# Patient Record
Sex: FEMALE | Race: WHITE | NOT HISPANIC OR LATINO | Employment: FULL TIME | URBAN - METROPOLITAN AREA
[De-identification: names, ages, dates, MRNs, and addresses within clinical notes are randomized per-mention and may not be internally consistent; named-entity substitution may affect disease eponyms.]

---

## 2023-02-17 PROBLEM — B96.89 BV (BACTERIAL VAGINOSIS): Status: ACTIVE | Noted: 2023-02-17

## 2023-02-17 PROBLEM — F41.8 DEPRESSION WITH ANXIETY: Status: ACTIVE | Noted: 2023-02-17

## 2023-02-17 PROBLEM — G47.09 OTHER INSOMNIA: Status: ACTIVE | Noted: 2023-02-17

## 2023-02-17 PROBLEM — J22 ACUTE RESPIRATORY INFECTION: Status: ACTIVE | Noted: 2023-02-17

## 2023-02-17 PROBLEM — J02.9 SORE THROAT: Status: ACTIVE | Noted: 2023-02-17

## 2023-02-17 PROBLEM — E53.8 VITAMIN B 12 DEFICIENCY: Status: ACTIVE | Noted: 2023-02-17

## 2023-02-17 PROBLEM — F41.9 ANXIETY: Status: ACTIVE | Noted: 2023-02-17

## 2023-02-17 PROBLEM — M54.2 NECK PAIN: Status: ACTIVE | Noted: 2023-02-17

## 2023-02-17 PROBLEM — Z04.9 CONDITION NOT FOUND: Status: ACTIVE | Noted: 2023-02-17

## 2023-02-17 PROBLEM — E03.9 HYPOTHYROID: Status: ACTIVE | Noted: 2023-02-17

## 2023-02-17 PROBLEM — F32.A MILD DEPRESSION: Status: ACTIVE | Noted: 2023-02-17

## 2023-02-17 PROBLEM — N76.0 BV (BACTERIAL VAGINOSIS): Status: ACTIVE | Noted: 2023-02-17

## 2023-02-17 PROBLEM — E66.9 OBESITY: Status: ACTIVE | Noted: 2023-02-17

## 2023-02-17 PROBLEM — M54.9 PAIN, UPPER BACK: Status: ACTIVE | Noted: 2023-02-17

## 2023-02-17 PROBLEM — M54.50 LOW BACK PAIN: Status: ACTIVE | Noted: 2023-02-17

## 2023-02-17 PROBLEM — R05.9 COUGH: Status: ACTIVE | Noted: 2023-02-17

## 2023-02-17 PROBLEM — B37.31 VAGINAL CANDIDIASIS: Status: ACTIVE | Noted: 2023-02-17

## 2023-02-17 PROBLEM — R63.5 ABNORMAL WEIGHT GAIN: Status: ACTIVE | Noted: 2023-02-17

## 2023-02-17 PROBLEM — N39.0 ACUTE UTI: Status: ACTIVE | Noted: 2023-02-17

## 2023-02-17 PROBLEM — F90.9 ADULT ADHD: Status: ACTIVE | Noted: 2023-02-17

## 2023-02-17 PROBLEM — E78.2 COMBINED HYPERLIPIDEMIA: Status: ACTIVE | Noted: 2023-02-17

## 2023-02-17 PROBLEM — N89.8 VAGINAL DISCHARGE: Status: ACTIVE | Noted: 2023-02-17

## 2023-02-17 PROBLEM — M54.50 ACUTE LOW BACK PAIN: Status: ACTIVE | Noted: 2023-02-17

## 2023-02-17 PROBLEM — N89.8 VAGINAL ODOR: Status: ACTIVE | Noted: 2023-02-17

## 2023-02-17 RX ORDER — NORGESTIMATE AND ETHINYL ESTRADIOL 0.25-0.035
1 KIT ORAL DAILY
COMMUNITY
Start: 2021-06-02

## 2023-02-17 RX ORDER — LEVOTHYROXINE SODIUM 50 UG/1
1 TABLET ORAL DAILY
COMMUNITY
Start: 2021-09-14 | End: 2023-08-30

## 2023-02-17 RX ORDER — IBUPROFEN 600 MG/1
600 TABLET ORAL 3 TIMES DAILY PRN
COMMUNITY
Start: 2021-11-23 | End: 2023-06-30 | Stop reason: ALTCHOICE

## 2023-02-17 RX ORDER — MUPIROCIN 20 MG/G
OINTMENT TOPICAL
COMMUNITY
End: 2023-03-31 | Stop reason: ALTCHOICE

## 2023-02-17 RX ORDER — DEXTROAMPHETAMINE SACCHARATE, AMPHETAMINE ASPARTATE, DEXTROAMPHETAMINE SULFATE AND AMPHETAMINE SULFATE 7.5; 7.5; 7.5; 7.5 MG/1; MG/1; MG/1; MG/1
TABLET ORAL
COMMUNITY
Start: 2020-07-06 | End: 2023-03-21 | Stop reason: SDUPTHER

## 2023-02-17 RX ORDER — BUPROPION HYDROCHLORIDE 150 MG/1
150 TABLET ORAL DAILY
COMMUNITY
End: 2023-03-31 | Stop reason: ALTCHOICE

## 2023-02-17 RX ORDER — GUAIFENESIN, PSEUDOEPHEDRINE HYDROCHLORIDE 600; 60 MG/1; MG/1
TABLET, EXTENDED RELEASE ORAL
COMMUNITY
Start: 2021-12-23

## 2023-03-21 DIAGNOSIS — F90.9 ADULT ADHD: ICD-10-CM

## 2023-03-21 RX ORDER — DEXTROAMPHETAMINE SACCHARATE, AMPHETAMINE ASPARTATE, DEXTROAMPHETAMINE SULFATE AND AMPHETAMINE SULFATE 7.5; 7.5; 7.5; 7.5 MG/1; MG/1; MG/1; MG/1
30 TABLET ORAL 2 TIMES DAILY
Qty: 60 TABLET | Refills: 0 | Status: SHIPPED | OUTPATIENT
Start: 2023-03-21 | End: 2023-05-08 | Stop reason: SDUPTHER

## 2023-03-31 ENCOUNTER — TELEMEDICINE (OUTPATIENT)
Dept: PRIMARY CARE | Facility: CLINIC | Age: 38
End: 2023-03-31
Payer: COMMERCIAL

## 2023-03-31 VITALS — BODY MASS INDEX: 32.14 KG/M2 | WEIGHT: 200 LBS | HEIGHT: 66 IN

## 2023-03-31 DIAGNOSIS — E66.09 CLASS 1 OBESITY DUE TO EXCESS CALORIES WITHOUT SERIOUS COMORBIDITY WITH BODY MASS INDEX (BMI) OF 32.0 TO 32.9 IN ADULT: ICD-10-CM

## 2023-03-31 DIAGNOSIS — F90.9 ADULT ADHD: ICD-10-CM

## 2023-03-31 DIAGNOSIS — F41.8 DEPRESSION WITH ANXIETY: Primary | ICD-10-CM

## 2023-03-31 DIAGNOSIS — E03.9 ACQUIRED HYPOTHYROIDISM: ICD-10-CM

## 2023-03-31 PROCEDURE — 99213 OFFICE O/P EST LOW 20 MIN: CPT | Performed by: INTERNAL MEDICINE

## 2023-03-31 RX ORDER — BUPROPION HYDROCHLORIDE 300 MG/1
300 TABLET ORAL EVERY MORNING
Qty: 30 TABLET | Refills: 1 | Status: SHIPPED | OUTPATIENT
Start: 2023-03-31 | End: 2023-04-26

## 2023-03-31 ASSESSMENT — ENCOUNTER SYMPTOMS
CARDIOVASCULAR NEGATIVE: 1
NEUROLOGICAL NEGATIVE: 1
HEMATOLOGIC/LYMPHATIC NEGATIVE: 1
GASTROINTESTINAL NEGATIVE: 1
EYES NEGATIVE: 1
CONSTITUTIONAL NEGATIVE: 1
RESPIRATORY NEGATIVE: 1

## 2023-03-31 ASSESSMENT — PATIENT HEALTH QUESTIONNAIRE - PHQ9
10. IF YOU CHECKED OFF ANY PROBLEMS, HOW DIFFICULT HAVE THESE PROBLEMS MADE IT FOR YOU TO DO YOUR WORK, TAKE CARE OF THINGS AT HOME, OR GET ALONG WITH OTHER PEOPLE: SOMEWHAT DIFFICULT
2. FEELING DOWN, DEPRESSED OR HOPELESS: SEVERAL DAYS
1. LITTLE INTEREST OR PLEASURE IN DOING THINGS: NOT AT ALL
SUM OF ALL RESPONSES TO PHQ9 QUESTIONS 1 AND 2: 1

## 2023-03-31 NOTE — PROGRESS NOTES
"Subjective   Patient ID: Ninfa Diaz is a 37 y.o. female who presents for Medication follow up .    here to f/u on depression,ADHD,hypothyroid and obesity,she stopped drinking alcohol and saw nutritionist and endo at [facility] and was able to loose some weight,she is feeling much better,no depressed moods but just feels more anxious because she is switching jobs,no alcohol since last visit with me.             Review of Systems   Constitutional: Negative.    HENT: Negative.     Eyes: Negative.    Respiratory: Negative.     Cardiovascular: Negative.    Gastrointestinal: Negative.    Genitourinary: Negative.    Neurological: Negative.    Hematological: Negative.    Psychiatric/Behavioral:          Some anxiety,changing jobs now.       Objective   Ht 1.676 m (5' 6\")   Wt 90.7 kg (200 lb)   BMI 32.28 kg/m²     Physical Exam  Constitutional:       General: She is not in acute distress.  Cardiovascular:      Rate and Rhythm: Normal rate.   Pulmonary:      Effort: No respiratory distress.   Neurological:      Mental Status: She is alert.         Assessment/Plan   Problem List Items Addressed This Visit          Endocrine/Metabolic    Hypothyroid    Obesity       Other    Adult ADHD    Depression with anxiety - Primary    Relevant Medications    buPROPion XL (Wellbutrin XL) 300 mg 24 hr tablet          "

## 2023-04-26 DIAGNOSIS — F90.9 ADULT ADHD: ICD-10-CM

## 2023-04-26 DIAGNOSIS — F41.8 DEPRESSION WITH ANXIETY: ICD-10-CM

## 2023-04-26 RX ORDER — BUPROPION HYDROCHLORIDE 300 MG/1
300 TABLET ORAL EVERY MORNING
Qty: 30 TABLET | Refills: 0 | Status: SHIPPED | OUTPATIENT
Start: 2023-04-26 | End: 2023-05-19

## 2023-05-08 RX ORDER — DEXTROAMPHETAMINE SACCHARATE, AMPHETAMINE ASPARTATE, DEXTROAMPHETAMINE SULFATE AND AMPHETAMINE SULFATE 7.5; 7.5; 7.5; 7.5 MG/1; MG/1; MG/1; MG/1
30 TABLET ORAL 2 TIMES DAILY
Qty: 60 TABLET | Refills: 0 | Status: SHIPPED | OUTPATIENT
Start: 2023-05-08 | End: 2023-06-30 | Stop reason: SDUPTHER

## 2023-05-12 ENCOUNTER — APPOINTMENT (OUTPATIENT)
Dept: PRIMARY CARE | Facility: CLINIC | Age: 38
End: 2023-05-12
Payer: COMMERCIAL

## 2023-05-19 DIAGNOSIS — F41.8 DEPRESSION WITH ANXIETY: ICD-10-CM

## 2023-05-19 RX ORDER — BUPROPION HYDROCHLORIDE 300 MG/1
300 TABLET ORAL EVERY MORNING
Qty: 30 TABLET | Refills: 0 | Status: SHIPPED | OUTPATIENT
Start: 2023-05-19 | End: 2023-06-12

## 2023-06-10 DIAGNOSIS — F41.8 DEPRESSION WITH ANXIETY: ICD-10-CM

## 2023-06-12 RX ORDER — BUPROPION HYDROCHLORIDE 300 MG/1
300 TABLET ORAL EVERY MORNING
Qty: 30 TABLET | Refills: 0 | Status: SHIPPED | OUTPATIENT
Start: 2023-06-12 | End: 2023-07-10

## 2023-06-30 ENCOUNTER — OFFICE VISIT (OUTPATIENT)
Dept: PRIMARY CARE | Facility: CLINIC | Age: 38
End: 2023-06-30
Payer: COMMERCIAL

## 2023-06-30 ENCOUNTER — LAB (OUTPATIENT)
Dept: LAB | Facility: LAB | Age: 38
End: 2023-06-30
Payer: COMMERCIAL

## 2023-06-30 VITALS
OXYGEN SATURATION: 98 % | DIASTOLIC BLOOD PRESSURE: 78 MMHG | WEIGHT: 200 LBS | SYSTOLIC BLOOD PRESSURE: 134 MMHG | TEMPERATURE: 96.9 F | HEART RATE: 78 BPM | HEIGHT: 66 IN | BODY MASS INDEX: 32.14 KG/M2

## 2023-06-30 DIAGNOSIS — E66.09 CLASS 1 OBESITY DUE TO EXCESS CALORIES WITHOUT SERIOUS COMORBIDITY WITH BODY MASS INDEX (BMI) OF 32.0 TO 32.9 IN ADULT: ICD-10-CM

## 2023-06-30 DIAGNOSIS — E03.9 ACQUIRED HYPOTHYROIDISM: ICD-10-CM

## 2023-06-30 DIAGNOSIS — M54.50 LOW BACK PAIN, UNSPECIFIED BACK PAIN LATERALITY, UNSPECIFIED CHRONICITY, UNSPECIFIED WHETHER SCIATICA PRESENT: ICD-10-CM

## 2023-06-30 DIAGNOSIS — F41.9 ANXIETY: ICD-10-CM

## 2023-06-30 DIAGNOSIS — L27.2 FOOD ALLERGIC SKIN REACTION: ICD-10-CM

## 2023-06-30 DIAGNOSIS — E78.2 COMBINED HYPERLIPIDEMIA: ICD-10-CM

## 2023-06-30 DIAGNOSIS — F90.9 ADULT ADHD: ICD-10-CM

## 2023-06-30 DIAGNOSIS — F41.8 DEPRESSION WITH ANXIETY: Primary | ICD-10-CM

## 2023-06-30 LAB — THYROTROPIN (MIU/L) IN SER/PLAS BY DETECTION LIMIT <= 0.05 MIU/L: 1.81 MIU/L (ref 0.44–3.98)

## 2023-06-30 PROCEDURE — 36415 COLL VENOUS BLD VENIPUNCTURE: CPT

## 2023-06-30 PROCEDURE — 84443 ASSAY THYROID STIM HORMONE: CPT

## 2023-06-30 PROCEDURE — 3008F BODY MASS INDEX DOCD: CPT | Performed by: INTERNAL MEDICINE

## 2023-06-30 PROCEDURE — 86003 ALLG SPEC IGE CRUDE XTRC EA: CPT

## 2023-06-30 PROCEDURE — 1036F TOBACCO NON-USER: CPT | Performed by: INTERNAL MEDICINE

## 2023-06-30 PROCEDURE — 99214 OFFICE O/P EST MOD 30 MIN: CPT | Performed by: INTERNAL MEDICINE

## 2023-06-30 RX ORDER — PHENTERMINE HYDROCHLORIDE 37.5 MG/1
37.5 TABLET ORAL
Qty: 30 TABLET | Refills: 0 | COMMUNITY
Start: 2023-06-02 | End: 2024-04-12 | Stop reason: DRUGHIGH

## 2023-06-30 RX ORDER — DEXTROAMPHETAMINE SACCHARATE, AMPHETAMINE ASPARTATE, DEXTROAMPHETAMINE SULFATE AND AMPHETAMINE SULFATE 7.5; 7.5; 7.5; 7.5 MG/1; MG/1; MG/1; MG/1
TABLET ORAL
Qty: 30 TABLET | Refills: 0 | Status: SHIPPED | OUTPATIENT
Start: 2023-06-30 | End: 2023-07-25 | Stop reason: SDUPTHER

## 2023-06-30 RX ORDER — IBUPROFEN 800 MG/1
800 TABLET ORAL 3 TIMES DAILY PRN
Qty: 60 TABLET | Refills: 0 | Status: SHIPPED | OUTPATIENT
Start: 2023-06-30 | End: 2023-07-17

## 2023-06-30 ASSESSMENT — ENCOUNTER SYMPTOMS
NEUROLOGICAL NEGATIVE: 1
EYES NEGATIVE: 1
BACK PAIN: 1
CONSTITUTIONAL NEGATIVE: 1
GASTROINTESTINAL NEGATIVE: 1
CARDIOVASCULAR NEGATIVE: 1
ROS SKIN COMMENTS: AS HPI.
RESPIRATORY NEGATIVE: 1
HEMATOLOGIC/LYMPHATIC NEGATIVE: 1
PSYCHIATRIC NEGATIVE: 1

## 2023-06-30 ASSESSMENT — PATIENT HEALTH QUESTIONNAIRE - PHQ9
7. TROUBLE CONCENTRATING ON THINGS, SUCH AS READING THE NEWSPAPER OR WATCHING TELEVISION: NOT AT ALL
8. MOVING OR SPEAKING SO SLOWLY THAT OTHER PEOPLE COULD HAVE NOTICED. OR THE OPPOSITE, BEING SO FIGETY OR RESTLESS THAT YOU HAVE BEEN MOVING AROUND A LOT MORE THAN USUAL: NOT AT ALL
6. FEELING BAD ABOUT YOURSELF - OR THAT YOU ARE A FAILURE OR HAVE LET YOURSELF OR YOUR FAMILY DOWN: NOT AT ALL
SUM OF ALL RESPONSES TO PHQ9 QUESTIONS 1 AND 2: 0
1. LITTLE INTEREST OR PLEASURE IN DOING THINGS: NOT AT ALL
9. THOUGHTS THAT YOU WOULD BE BETTER OFF DEAD, OR OF HURTING YOURSELF: NOT AT ALL
5. POOR APPETITE OR OVEREATING: NOT AT ALL
3. TROUBLE FALLING OR STAYING ASLEEP OR SLEEPING TOO MUCH: SEVERAL DAYS
10. IF YOU CHECKED OFF ANY PROBLEMS, HOW DIFFICULT HAVE THESE PROBLEMS MADE IT FOR YOU TO DO YOUR WORK, TAKE CARE OF THINGS AT HOME, OR GET ALONG WITH OTHER PEOPLE: NOT DIFFICULT AT ALL
2. FEELING DOWN, DEPRESSED OR HOPELESS: NOT AT ALL
SUM OF ALL RESPONSES TO PHQ QUESTIONS 1-9: 2
4. FEELING TIRED OR HAVING LITTLE ENERGY: SEVERAL DAYS

## 2023-06-30 NOTE — PROGRESS NOTES
"Subjective   Patient ID: Ninfa Diaz is a 38 y.o. female who presents for 6 month follow up.    here to f/u on depression,ADHD,hypothyroid and obesity,she stopped drinking alcohol and saw nutritionist and endo at [facility] and was able to loose some weight,she was started by endo on Adipex 1 in AM and was told to take only 1 Adderall in PM, and done well,she is feeling much better,denies any depressed or anxious moods.  She has her usual LBP and takes Ibp 600 mg less than twice a week PRN,but in past 800 mg worked better and wants a new Rx.  She noted some flushed face and itchy skin when eating some food,in past it happened after drinking alcohol,but she stopped a while ago.             Review of Systems   Constitutional: Negative.    HENT: Negative.     Eyes: Negative.    Respiratory: Negative.     Cardiovascular: Negative.    Gastrointestinal: Negative.    Genitourinary: Negative.    Musculoskeletal:  Positive for back pain.   Skin:         As HPI.   Neurological: Negative.    Hematological: Negative.    Psychiatric/Behavioral: Negative.         Objective   /78   Pulse 78   Temp 36.1 °C (96.9 °F) (Skin)   Ht 1.676 m (5' 6\")   Wt 90.7 kg (200 lb)   SpO2 98%   BMI 32.28 kg/m²     Physical Exam  Cardiovascular:      Rate and Rhythm: Normal rate.         Assessment/Plan   Problem List Items Addressed This Visit       Anxiety     Stable on current med.         Adult ADHD     Stable on current Adderall,but is on 1 only daily due to being on Adipex through her endocrinologist.         Relevant Medications    amphetamine-dextroamphetamine (Adderall) 30 mg tablet    Combined hyperlipidemia     Follow low fat diet.         Depression with anxiety - Primary    Hypothyroid     Stable on Levothyroxine.         Relevant Orders    TSH with reflex to Free T4 if abnormal    Low back pain    Relevant Medications    ibuprofen 800 mg tablet    Obesity     Improving with Adipex.         Food allergic skin reaction    " Relevant Orders    Food Allergy Profile IgE

## 2023-06-30 NOTE — ASSESSMENT & PLAN NOTE
Stable on current Adderall,but is on 1 only daily due to being on Adipex through her endocrinologist.

## 2023-07-01 LAB
ALLERGEN FOOD: CLAM (RUDITAPES SPP.) IGE (KU/L): <0.1 KU/L
ALLERGEN FOOD: EGG WHITE IGE (KU/L): <0.1 KU/L
ALLERGEN FOOD: FISH (COD) GADUS MORHUA) IGE (KU/L): <0.1 KU/L
ALLERGEN FOOD: MAIZE, CORN (ZEA MAYS) IGE (KU/L): <0.1 KU/L
ALLERGEN FOOD: MILK IGE (KU/L): <0.1 KU/L
ALLERGEN FOOD: PEANUT (ARACHIS HYPOGAEA) IGE (KU/L): <0.1 KU/L
ALLERGEN FOOD: SCALLOP (PECTEN SPP.) IGE (KU/L): <0.1 KU/L
ALLERGEN FOOD: SESAME SEED (SESAMUM INDICUM) IGE (KU/L): <0.1 KU/L
ALLERGEN FOOD: SHRIMP (P. BOREALIS/MONODON, M. BARBATA/JOYNERI) IGE (KU/L): <0.1 KU/L
ALLERGEN FOOD: SOYBEAN (GLYCINE MAX) IGE (KU/L): <0.1 KU/L
ALLERGEN FOOD: WALNUT (JUGLANS SPP.) IGE (KU/L): <0.1 KU/L
ALLERGEN FOOD: WHEAT (TRITICUM AESTIVUM) IGE (KU/L): <0.1 KU/L
IMMUNOCAP INTERPRETATION: NORMAL

## 2023-07-07 DIAGNOSIS — F41.8 DEPRESSION WITH ANXIETY: ICD-10-CM

## 2023-07-10 RX ORDER — BUPROPION HYDROCHLORIDE 300 MG/1
300 TABLET ORAL EVERY MORNING
Qty: 30 TABLET | Refills: 0 | Status: SHIPPED | OUTPATIENT
Start: 2023-07-10 | End: 2023-08-07

## 2023-07-16 DIAGNOSIS — M54.50 LOW BACK PAIN, UNSPECIFIED BACK PAIN LATERALITY, UNSPECIFIED CHRONICITY, UNSPECIFIED WHETHER SCIATICA PRESENT: ICD-10-CM

## 2023-07-16 DIAGNOSIS — F90.9 ADULT ADHD: ICD-10-CM

## 2023-07-17 RX ORDER — IBUPROFEN 800 MG/1
TABLET ORAL
Qty: 60 TABLET | Refills: 0 | Status: SHIPPED | OUTPATIENT
Start: 2023-07-17

## 2023-07-25 RX ORDER — DEXTROAMPHETAMINE SACCHARATE, AMPHETAMINE ASPARTATE, DEXTROAMPHETAMINE SULFATE AND AMPHETAMINE SULFATE 7.5; 7.5; 7.5; 7.5 MG/1; MG/1; MG/1; MG/1
TABLET ORAL
Qty: 30 TABLET | Refills: 0 | Status: SHIPPED | OUTPATIENT
Start: 2023-07-25 | End: 2023-08-21 | Stop reason: SDUPTHER

## 2023-08-06 DIAGNOSIS — F41.8 DEPRESSION WITH ANXIETY: ICD-10-CM

## 2023-08-07 RX ORDER — BUPROPION HYDROCHLORIDE 300 MG/1
300 TABLET ORAL
Qty: 30 TABLET | Refills: 0 | Status: SHIPPED | OUTPATIENT
Start: 2023-08-07 | End: 2023-08-15

## 2023-08-14 DIAGNOSIS — F41.8 DEPRESSION WITH ANXIETY: ICD-10-CM

## 2023-08-15 DIAGNOSIS — F41.8 DEPRESSION WITH ANXIETY: ICD-10-CM

## 2023-08-15 RX ORDER — BUPROPION HYDROCHLORIDE 300 MG/1
300 TABLET ORAL
Qty: 30 TABLET | Refills: 0 | Status: SHIPPED | OUTPATIENT
Start: 2023-08-15 | End: 2023-08-15

## 2023-08-15 RX ORDER — BUPROPION HYDROCHLORIDE 300 MG/1
300 TABLET ORAL
Qty: 90 TABLET | Refills: 3 | Status: SHIPPED | OUTPATIENT
Start: 2023-08-15 | End: 2023-11-09 | Stop reason: WASHOUT

## 2023-08-15 NOTE — TELEPHONE ENCOUNTER
Pt called stating pharmacy is trying to contacting us need to change Wellbutrin rx from 30 days to 90 days for insurance will pick it up the cost.

## 2023-08-21 ENCOUNTER — TELEPHONE (OUTPATIENT)
Dept: PRIMARY CARE | Facility: CLINIC | Age: 38
End: 2023-08-21
Payer: COMMERCIAL

## 2023-08-21 DIAGNOSIS — F90.9 ADULT ADHD: ICD-10-CM

## 2023-08-21 RX ORDER — DEXTROAMPHETAMINE SACCHARATE, AMPHETAMINE ASPARTATE, DEXTROAMPHETAMINE SULFATE AND AMPHETAMINE SULFATE 7.5; 7.5; 7.5; 7.5 MG/1; MG/1; MG/1; MG/1
30 TABLET ORAL 2 TIMES DAILY
Qty: 60 TABLET | Refills: 0 | Status: SHIPPED | OUTPATIENT
Start: 2023-08-21 | End: 2023-09-19 | Stop reason: SDUPTHER

## 2023-08-21 NOTE — TELEPHONE ENCOUNTER
Pt needs refill for adderall - but it needs to go back to 60 pills - 2 daily - send to [facility]

## 2023-08-30 DIAGNOSIS — E03.9 HYPOTHYROIDISM, UNSPECIFIED: ICD-10-CM

## 2023-08-30 RX ORDER — LEVOTHYROXINE SODIUM 50 UG/1
50 TABLET ORAL DAILY
Qty: 90 TABLET | Refills: 3 | Status: SHIPPED | OUTPATIENT
Start: 2023-08-30

## 2023-09-08 LAB
CHLAMYDIA TRACH., AMPLIFIED: NEGATIVE
N. GONORRHEA, AMPLIFIED: NEGATIVE
TRICHOMONAS VAGINALIS: NEGATIVE

## 2023-09-19 DIAGNOSIS — F90.9 ADULT ADHD: ICD-10-CM

## 2023-09-19 RX ORDER — DEXTROAMPHETAMINE SACCHARATE, AMPHETAMINE ASPARTATE, DEXTROAMPHETAMINE SULFATE AND AMPHETAMINE SULFATE 7.5; 7.5; 7.5; 7.5 MG/1; MG/1; MG/1; MG/1
30 TABLET ORAL 2 TIMES DAILY
Qty: 60 TABLET | Refills: 0 | Status: SHIPPED | OUTPATIENT
Start: 2023-09-19 | End: 2023-10-17 | Stop reason: SDUPTHER

## 2023-10-17 DIAGNOSIS — F90.9 ADULT ADHD: ICD-10-CM

## 2023-10-17 RX ORDER — DEXTROAMPHETAMINE SACCHARATE, AMPHETAMINE ASPARTATE, DEXTROAMPHETAMINE SULFATE AND AMPHETAMINE SULFATE 7.5; 7.5; 7.5; 7.5 MG/1; MG/1; MG/1; MG/1
30 TABLET ORAL 2 TIMES DAILY
Qty: 60 TABLET | Refills: 0 | Status: SHIPPED | OUTPATIENT
Start: 2023-10-17 | End: 2023-11-20 | Stop reason: SDUPTHER

## 2023-10-27 PROBLEM — E78.5 DYSLIPIDEMIA: Status: ACTIVE | Noted: 2023-04-10

## 2023-11-09 ENCOUNTER — OFFICE VISIT (OUTPATIENT)
Dept: PRIMARY CARE | Facility: CLINIC | Age: 38
End: 2023-11-09
Payer: COMMERCIAL

## 2023-11-09 VITALS
RESPIRATION RATE: 16 BRPM | SYSTOLIC BLOOD PRESSURE: 130 MMHG | OXYGEN SATURATION: 98 % | TEMPERATURE: 98.8 F | WEIGHT: 194 LBS | HEIGHT: 67 IN | BODY MASS INDEX: 30.45 KG/M2 | DIASTOLIC BLOOD PRESSURE: 86 MMHG | HEART RATE: 83 BPM

## 2023-11-09 DIAGNOSIS — J06.9 ACUTE UPPER RESPIRATORY INFECTION: Primary | ICD-10-CM

## 2023-11-09 DIAGNOSIS — J02.9 SORE THROAT: ICD-10-CM

## 2023-11-09 PROCEDURE — 3008F BODY MASS INDEX DOCD: CPT | Performed by: STUDENT IN AN ORGANIZED HEALTH CARE EDUCATION/TRAINING PROGRAM

## 2023-11-09 PROCEDURE — 1036F TOBACCO NON-USER: CPT | Performed by: STUDENT IN AN ORGANIZED HEALTH CARE EDUCATION/TRAINING PROGRAM

## 2023-11-09 PROCEDURE — 99213 OFFICE O/P EST LOW 20 MIN: CPT | Performed by: STUDENT IN AN ORGANIZED HEALTH CARE EDUCATION/TRAINING PROGRAM

## 2023-11-09 RX ORDER — AZITHROMYCIN 250 MG/1
TABLET, FILM COATED ORAL
Qty: 6 TABLET | Refills: 0 | Status: SHIPPED | OUTPATIENT
Start: 2023-11-09 | End: 2023-11-14

## 2023-11-09 ASSESSMENT — ENCOUNTER SYMPTOMS
VOICE CHANGE: 1
VOMITING: 0
LIGHT-HEADEDNESS: 1
SINUS PRESSURE: 1
FEVER: 0
SINUS PAIN: 0
DIZZINESS: 0
RHINORRHEA: 1
TROUBLE SWALLOWING: 1
NAUSEA: 1
WHEEZING: 0
DIAPHORESIS: 0
SHORTNESS OF BREATH: 0
DIARRHEA: 0
CHILLS: 0
DYSURIA: 0
SORE THROAT: 1

## 2023-11-20 DIAGNOSIS — F90.9 ADULT ADHD: ICD-10-CM

## 2023-11-20 RX ORDER — DEXTROAMPHETAMINE SACCHARATE, AMPHETAMINE ASPARTATE, DEXTROAMPHETAMINE SULFATE AND AMPHETAMINE SULFATE 7.5; 7.5; 7.5; 7.5 MG/1; MG/1; MG/1; MG/1
30 TABLET ORAL 2 TIMES DAILY
Qty: 60 TABLET | Refills: 0 | Status: SHIPPED | OUTPATIENT
Start: 2023-11-20 | End: 2023-12-26 | Stop reason: SDUPTHER

## 2023-12-22 ENCOUNTER — APPOINTMENT (OUTPATIENT)
Dept: PRIMARY CARE | Facility: CLINIC | Age: 38
End: 2023-12-22
Payer: COMMERCIAL

## 2023-12-22 DIAGNOSIS — F90.9 ADULT ADHD: ICD-10-CM

## 2023-12-26 RX ORDER — DEXTROAMPHETAMINE SACCHARATE, AMPHETAMINE ASPARTATE, DEXTROAMPHETAMINE SULFATE AND AMPHETAMINE SULFATE 7.5; 7.5; 7.5; 7.5 MG/1; MG/1; MG/1; MG/1
30 TABLET ORAL 2 TIMES DAILY
Qty: 60 TABLET | Refills: 0 | Status: SHIPPED | OUTPATIENT
Start: 2023-12-26 | End: 2024-02-02 | Stop reason: SDUPTHER

## 2023-12-29 ENCOUNTER — TELEPHONE (OUTPATIENT)
Dept: OBSTETRICS AND GYNECOLOGY | Facility: CLINIC | Age: 38
End: 2023-12-29
Payer: COMMERCIAL

## 2023-12-29 NOTE — TELEPHONE ENCOUNTER
"Pt states \"I have a yeast infection\" She c/o vulvar redness, swelling , itching with an increased whitish discharge x24hrs. Denies odor and urinary sx. D/t the holiday weekend, pt is unable to come to the office for a self swab. Per chart she has had sx and been tx'd in the past. Rx for Fluconazole phoned into pt pharm. She understands that an appt  is needed of sx return or do not resolve.  "

## 2024-01-05 ENCOUNTER — OFFICE VISIT (OUTPATIENT)
Dept: PRIMARY CARE | Facility: CLINIC | Age: 39
End: 2024-01-05
Payer: COMMERCIAL

## 2024-01-05 VITALS
HEIGHT: 67 IN | WEIGHT: 192.6 LBS | HEART RATE: 75 BPM | TEMPERATURE: 96.5 F | SYSTOLIC BLOOD PRESSURE: 128 MMHG | BODY MASS INDEX: 30.23 KG/M2 | OXYGEN SATURATION: 98 % | DIASTOLIC BLOOD PRESSURE: 88 MMHG

## 2024-01-05 DIAGNOSIS — Z12.39 ENCOUNTER FOR SCREENING FOR MALIGNANT NEOPLASM OF BREAST, UNSPECIFIED SCREENING MODALITY: ICD-10-CM

## 2024-01-05 DIAGNOSIS — E66.09 CLASS 1 OBESITY DUE TO EXCESS CALORIES WITHOUT SERIOUS COMORBIDITY WITH BODY MASS INDEX (BMI) OF 32.0 TO 32.9 IN ADULT: Chronic | ICD-10-CM

## 2024-01-05 DIAGNOSIS — E03.9 ACQUIRED HYPOTHYROIDISM: Chronic | ICD-10-CM

## 2024-01-05 DIAGNOSIS — F90.9 ADULT ADHD: Chronic | ICD-10-CM

## 2024-01-05 DIAGNOSIS — Z00.00 ROUTINE ADULT HEALTH MAINTENANCE: Primary | ICD-10-CM

## 2024-01-05 PROBLEM — E66.9 OBESITY: Chronic | Status: ACTIVE | Noted: 2023-02-17

## 2024-01-05 PROCEDURE — 99395 PREV VISIT EST AGE 18-39: CPT | Performed by: NURSE PRACTITIONER

## 2024-01-05 PROCEDURE — 1036F TOBACCO NON-USER: CPT | Performed by: NURSE PRACTITIONER

## 2024-01-05 PROCEDURE — 3008F BODY MASS INDEX DOCD: CPT | Performed by: NURSE PRACTITIONER

## 2024-01-05 ASSESSMENT — PROMIS GLOBAL HEALTH SCALE
RATE_MENTAL_HEALTH: VERY GOOD
RATE_SOCIAL_SATISFACTION: VERY GOOD
RATE_QUALITY_OF_LIFE: VERY GOOD
EMOTIONAL_PROBLEMS: SOMETIMES
RATE_AVERAGE_FATIGUE: MILD
CARRYOUT_PHYSICAL_ACTIVITIES: COMPLETELY
RATE_PHYSICAL_HEALTH: VERY GOOD
RATE_AVERAGE_PAIN: 0
RATE_GENERAL_HEALTH: VERY GOOD
CARRYOUT_SOCIAL_ACTIVITIES: VERY GOOD

## 2024-01-05 ASSESSMENT — PATIENT HEALTH QUESTIONNAIRE - PHQ9
2. FEELING DOWN, DEPRESSED OR HOPELESS: SEVERAL DAYS
SUM OF ALL RESPONSES TO PHQ9 QUESTIONS 1 AND 2: 2
1. LITTLE INTEREST OR PLEASURE IN DOING THINGS: SEVERAL DAYS
10. IF YOU CHECKED OFF ANY PROBLEMS, HOW DIFFICULT HAVE THESE PROBLEMS MADE IT FOR YOU TO DO YOUR WORK, TAKE CARE OF THINGS AT HOME, OR GET ALONG WITH OTHER PEOPLE: SOMEWHAT DIFFICULT

## 2024-01-05 NOTE — PROGRESS NOTES
"Subjective   Patient ID: Ninfa Diaz is a 38 y.o. female who presents for Annual Exam.    Well Adult Physical   Patient here for a comprehensive physical exam.The patient reports no problems    Do you take any herbs or supplements that were not prescribed by a doctor? yes VITAMIN C, D AND ZINC       History:  LMP: No LMP recorded.  Last pap date: 7/2020  Abnormal pap? no        On apidpex for weight loss. Given by endo.    Review of Systems  otherwise negative aside from what was mentioned above in HPI.    Objective   /88   Pulse 75   Temp 35.8 °C (96.5 °F)   Ht 1.689 m (5' 6.5\")   Wt 87.4 kg (192 lb 9.6 oz)   SpO2 98%   BMI 30.62 kg/m²     Physical Exam  Vitals reviewed.   Constitutional:       Appearance: Normal appearance.   HENT:      Head: Normocephalic.      Right Ear: Tympanic membrane, ear canal and external ear normal.      Left Ear: Tympanic membrane, ear canal and external ear normal.      Nose: Nose normal.      Mouth/Throat:      Mouth: Mucous membranes are moist.   Eyes:      Conjunctiva/sclera: Conjunctivae normal.      Pupils: Pupils are equal, round, and reactive to light.   Cardiovascular:      Rate and Rhythm: Normal rate and regular rhythm.      Pulses: Normal pulses.      Heart sounds: Normal heart sounds.   Pulmonary:      Effort: Pulmonary effort is normal.      Breath sounds: Normal breath sounds.   Chest:   Breasts:     Right: Normal.      Left: Normal.   Abdominal:      General: Abdomen is flat. Bowel sounds are normal.      Palpations: Abdomen is soft.   Musculoskeletal:         General: Normal range of motion.      Cervical back: Normal range of motion and neck supple.   Lymphadenopathy:      Upper Body:      Right upper body: No supraclavicular, axillary or pectoral adenopathy.      Left upper body: No supraclavicular, axillary or pectoral adenopathy.   Skin:     General: Skin is warm.   Neurological:      Mental Status: She is alert.   Psychiatric:         Mood and " Affect: Mood normal.         Behavior: Behavior normal.         Thought Content: Thought content normal.         Judgment: Judgment normal.         Assessment/Plan   Problem List Items Addressed This Visit             ICD-10-CM    Adult ADHD (Chronic) F90.9     Stable on current Adderall,but is on 1 only daily due to being on Adipex through her endocrinologist.         Hypothyroid (Chronic) E03.9     Stable on Levothyroxine.         Obesity (Chronic) E66.9     Improving with Adipex.          Other Visit Diagnoses         Codes    Routine adult health maintenance    -  Primary Z00.00    Encounter for screening for malignant neoplasm of breast, unspecified screening modality     Z12.39    Relevant Orders    BI mammo bilateral screening tomosynthesis          Health Maintenance  Women:  Mammogram annually starting at 40 in June; ordered  Self breast exams monthly  Clinical breast examination done per patient request.    Immunizations and routine follow up:  TDAP: recommended. DUE. Declines today  Influenza vaccine: Declines  Eye Examinations: annually  Dental Examinations: semi annually    Encourage diet and exercise to maintain healthy lifestyle.

## 2024-02-02 ENCOUNTER — TELEPHONE (OUTPATIENT)
Dept: PRIMARY CARE | Facility: CLINIC | Age: 39
End: 2024-02-02

## 2024-02-02 ENCOUNTER — APPOINTMENT (OUTPATIENT)
Dept: PRIMARY CARE | Facility: CLINIC | Age: 39
End: 2024-02-02
Payer: COMMERCIAL

## 2024-02-02 DIAGNOSIS — F90.9 ADULT ADHD: ICD-10-CM

## 2024-02-02 RX ORDER — DEXTROAMPHETAMINE SACCHARATE, AMPHETAMINE ASPARTATE, DEXTROAMPHETAMINE SULFATE AND AMPHETAMINE SULFATE 7.5; 7.5; 7.5; 7.5 MG/1; MG/1; MG/1; MG/1
30 TABLET ORAL 2 TIMES DAILY
Qty: 60 TABLET | Refills: 0 | Status: SHIPPED | OUTPATIENT
Start: 2024-02-02 | End: 2024-02-26 | Stop reason: SDUPTHER

## 2024-02-02 NOTE — TELEPHONE ENCOUNTER
Pt of Dr Salomon's. Calling in regards to a refill on a medication. She is currently out of and would like it addressed before the weekend if possible. Please advise. Thank you!  [facility] [place] on [address]  Adderall 30mg twice a day #60

## 2024-02-26 DIAGNOSIS — F90.9 ADULT ADHD: ICD-10-CM

## 2024-02-27 RX ORDER — DEXTROAMPHETAMINE SACCHARATE, AMPHETAMINE ASPARTATE, DEXTROAMPHETAMINE SULFATE AND AMPHETAMINE SULFATE 7.5; 7.5; 7.5; 7.5 MG/1; MG/1; MG/1; MG/1
30 TABLET ORAL 2 TIMES DAILY
Qty: 60 TABLET | Refills: 0 | Status: SHIPPED | OUTPATIENT
Start: 2024-02-27 | End: 2024-03-26 | Stop reason: SDUPTHER

## 2024-03-01 ENCOUNTER — APPOINTMENT (OUTPATIENT)
Dept: PRIMARY CARE | Facility: CLINIC | Age: 39
End: 2024-03-01
Payer: COMMERCIAL

## 2024-03-08 ENCOUNTER — APPOINTMENT (OUTPATIENT)
Dept: PRIMARY CARE | Facility: CLINIC | Age: 39
End: 2024-03-08
Payer: COMMERCIAL

## 2024-03-13 ENCOUNTER — OFFICE VISIT (OUTPATIENT)
Dept: PRIMARY CARE | Facility: CLINIC | Age: 39
End: 2024-03-13
Payer: COMMERCIAL

## 2024-03-13 ENCOUNTER — LAB (OUTPATIENT)
Dept: LAB | Facility: LAB | Age: 39
End: 2024-03-13
Payer: COMMERCIAL

## 2024-03-13 VITALS
BODY MASS INDEX: 30.53 KG/M2 | DIASTOLIC BLOOD PRESSURE: 80 MMHG | WEIGHT: 192 LBS | SYSTOLIC BLOOD PRESSURE: 132 MMHG | HEART RATE: 69 BPM | OXYGEN SATURATION: 98 %

## 2024-03-13 DIAGNOSIS — C43.59 MALIGNANT MELANOMA OF TRUNK (MULTI): ICD-10-CM

## 2024-03-13 DIAGNOSIS — Z79.899 HIGH RISK MEDICATION USE: ICD-10-CM

## 2024-03-13 DIAGNOSIS — F90.9 ADULT ADHD: Primary | Chronic | ICD-10-CM

## 2024-03-13 PROBLEM — J32.9 SINUSITIS: Status: ACTIVE | Noted: 2023-02-17

## 2024-03-13 PROBLEM — R53.83 FATIGUE: Status: ACTIVE | Noted: 2024-03-13

## 2024-03-13 PROBLEM — F10.929 ALCOHOLIC INTOXICATION (CMS-HCC): Status: ACTIVE | Noted: 2024-03-13

## 2024-03-13 PROBLEM — R19.7 DIARRHEA: Status: ACTIVE | Noted: 2024-03-13

## 2024-03-13 LAB
AMPHETAMINES UR QL SCN: ABNORMAL
BARBITURATES UR QL SCN: ABNORMAL
BENZODIAZ UR QL SCN: ABNORMAL
BZE UR QL SCN: ABNORMAL
CANNABINOIDS UR QL SCN: ABNORMAL
FENTANYL+NORFENTANYL UR QL SCN: ABNORMAL
METHADONE UR QL SCN: ABNORMAL
OPIATES UR QL SCN: ABNORMAL
OXYCODONE+OXYMORPHONE UR QL SCN: ABNORMAL
PCP UR QL SCN: ABNORMAL

## 2024-03-13 PROCEDURE — 80307 DRUG TEST PRSMV CHEM ANLYZR: CPT

## 2024-03-13 PROCEDURE — 99214 OFFICE O/P EST MOD 30 MIN: CPT | Performed by: INTERNAL MEDICINE

## 2024-03-13 PROCEDURE — 3008F BODY MASS INDEX DOCD: CPT | Performed by: INTERNAL MEDICINE

## 2024-03-13 PROCEDURE — 1036F TOBACCO NON-USER: CPT | Performed by: INTERNAL MEDICINE

## 2024-03-13 PROCEDURE — 80324 DRUG SCREEN AMPHETAMINES 1/2: CPT

## 2024-03-13 ASSESSMENT — PATIENT HEALTH QUESTIONNAIRE - PHQ9
1. LITTLE INTEREST OR PLEASURE IN DOING THINGS: NOT AT ALL
2. FEELING DOWN, DEPRESSED OR HOPELESS: NOT AT ALL
SUM OF ALL RESPONSES TO PHQ9 QUESTIONS 1 AND 2: 0

## 2024-03-13 NOTE — PROGRESS NOTES
Subjective   Patient ID: Ninfa Diaz is a 38 y.o. female who presents for Follow-up (Patient is here for a follow up and a urine drug screen that will given at the lab. ).    here to f/u on depression,ADHD,hypothyroid and obesity,she stopped drinking alcohol and saw nutritionist and endo at [facility] and was able to loose some weight,she was started by endo on Adipex 1 in AM and was told to take only 1 Adderall in PM, and done well,she is feeling much better,denies any depressed or anxious moods.  Her Adipex has been reduced down to 15 mg.  She has her usual LBP and takes Ibp 600 mg less than twice a week PRN,but in past 800 mg worked better .  She is due for CS agreement signature and urine drug screen.  She is feeling that current Adderall is not helping her as it used to be in the past sometimes has trouble with concentration.               Review of Systems   Psychiatric/Behavioral:          As HPI.       Objective   /80 (BP Location: Left arm, Patient Position: Sitting, BP Cuff Size: Large adult)   Pulse 69   Wt 87.1 kg (192 lb)   SpO2 98%   BMI 30.53 kg/m²     Physical Exam  Constitutional:       Appearance: Normal appearance.   HENT:      Head: Normocephalic and atraumatic.   Eyes:      Extraocular Movements: Extraocular movements intact.      Pupils: Pupils are equal, round, and reactive to light.   Cardiovascular:      Rate and Rhythm: Normal rate and regular rhythm.      Heart sounds: Normal heart sounds.   Pulmonary:      Effort: Pulmonary effort is normal.      Breath sounds: Normal breath sounds. No wheezing or rhonchi.   Musculoskeletal:         General: Normal range of motion.      Cervical back: Normal range of motion and neck supple.      Right lower leg: No edema.      Left lower leg: No edema.   Skin:     General: Skin is warm.   Neurological:      General: No focal deficit present.      Mental Status: She is alert and oriented to person, place, and time.   Psychiatric:         Mood and  Affect: Mood normal.         Behavior: Behavior normal.         Assessment/Plan   Problem List Items Addressed This Visit             ICD-10-CM    Adult ADHD - Primary (Chronic) F90.9     CS agreement form completed today.  Order urine drug screen.  Continue same Adderall for now, patient fell 5 days in past and 1 other medication, therefore we will refer her to a psychiatrist for consultation on her current ADHD medication.  Follow-up in 6 months.         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         High risk medication use Z79.899    Relevant Orders    Drug Screen, Urine With Reflex to Confirmation (Completed)

## 2024-03-14 PROBLEM — J22 ACUTE RESPIRATORY INFECTION: Status: RESOLVED | Noted: 2023-02-17 | Resolved: 2024-03-14

## 2024-03-14 PROBLEM — Z79.899 HIGH RISK MEDICATION USE: Status: ACTIVE | Noted: 2024-03-14

## 2024-03-15 NOTE — ASSESSMENT & PLAN NOTE
CS agreement form completed today.  Order urine drug screen.  Continue same Adderall for now, patient fell 5 days in past and 1 other medication, therefore we will refer her to a psychiatrist for consultation on her current ADHD medication.  Follow-up in 6 months.

## 2024-03-18 LAB
AMPHET UR-MCNC: >5000 NG/ML
MDA UR-MCNC: <200 NG/ML
MDEA UR-MCNC: <200 NG/ML
MDMA UR-MCNC: <200 NG/ML
METHAMPHET UR-MCNC: <200 NG/ML
PHENTERMINE UR CFM-MCNC: >5000 NG/ML

## 2024-03-26 DIAGNOSIS — F90.9 ADULT ADHD: ICD-10-CM

## 2024-03-26 RX ORDER — DEXTROAMPHETAMINE SACCHARATE, AMPHETAMINE ASPARTATE, DEXTROAMPHETAMINE SULFATE AND AMPHETAMINE SULFATE 7.5; 7.5; 7.5; 7.5 MG/1; MG/1; MG/1; MG/1
30 TABLET ORAL 2 TIMES DAILY
Qty: 60 TABLET | Refills: 0 | Status: SHIPPED | OUTPATIENT
Start: 2024-03-26 | End: 2024-04-24 | Stop reason: SDUPTHER

## 2024-04-11 ASSESSMENT — ENCOUNTER SYMPTOMS
COUGH: 1
RHINORRHEA: 1

## 2024-04-12 ENCOUNTER — TELEMEDICINE (OUTPATIENT)
Dept: PRIMARY CARE | Facility: CLINIC | Age: 39
End: 2024-04-12
Payer: COMMERCIAL

## 2024-04-12 VITALS — WEIGHT: 188 LBS | BODY MASS INDEX: 29.89 KG/M2

## 2024-04-12 DIAGNOSIS — R05.1 ACUTE COUGH: ICD-10-CM

## 2024-04-12 DIAGNOSIS — J22 ACUTE RESPIRATORY INFECTION: Primary | ICD-10-CM

## 2024-04-12 PROCEDURE — 99213 OFFICE O/P EST LOW 20 MIN: CPT | Performed by: INTERNAL MEDICINE

## 2024-04-12 PROCEDURE — 3008F BODY MASS INDEX DOCD: CPT | Performed by: INTERNAL MEDICINE

## 2024-04-12 PROCEDURE — 1036F TOBACCO NON-USER: CPT | Performed by: INTERNAL MEDICINE

## 2024-04-12 RX ORDER — PHENTERMINE HYDROCHLORIDE 15 MG/1
15 CAPSULE ORAL DAILY
COMMUNITY
Start: 2024-03-19

## 2024-04-12 RX ORDER — METHYLPREDNISOLONE 4 MG/1
TABLET ORAL
Qty: 21 TABLET | Refills: 0 | Status: SHIPPED | OUTPATIENT
Start: 2024-04-12 | End: 2024-04-19

## 2024-04-12 ASSESSMENT — ENCOUNTER SYMPTOMS
PSYCHIATRIC NEGATIVE: 1
GASTROINTESTINAL NEGATIVE: 1
HEMATOLOGIC/LYMPHATIC NEGATIVE: 1
CONSTITUTIONAL NEGATIVE: 1
SHORTNESS OF BREATH: 0
WHEEZING: 1
STRIDOR: 0
EYES NEGATIVE: 1
CARDIOVASCULAR NEGATIVE: 1
APNEA: 0
CHEST TIGHTNESS: 0
NEUROLOGICAL NEGATIVE: 1
COUGH: 1

## 2024-04-12 ASSESSMENT — PATIENT HEALTH QUESTIONNAIRE - PHQ9
2. FEELING DOWN, DEPRESSED OR HOPELESS: NOT AT ALL
SUM OF ALL RESPONSES TO PHQ9 QUESTIONS 1 AND 2: 0
1. LITTLE INTEREST OR PLEASURE IN DOING THINGS: NOT AT ALL

## 2024-04-12 NOTE — PROGRESS NOTES
Subjective   Patient ID: Ninfa Diaz is a 38 y.o. female who presents for Cough (Patient states that she got sick a couple of weeks ago and went to [facility] and flu, COVID, and strep were negative. Patient did have extreme body aches and fever that has gone away, but patient still has a lingering dry cough. ).    An interactive audio and video telecommunication system with patient real time communications between the patient (at the original site) and provider (at the distant site) was utilized to provide this telehealth service.  Verbal consent was requested and obtained from the patient at this date for a telehealth.  Patient seen virtually today because of worsening persistent cough and tickle in the throat she told me that, 1 week ago she had respiratory infection with temperature 101 for 4 days sore throat fatigue myalgia, clear rhinorrhea she was seen at [facility] minute clinic told had viral infection she tested negative for COVID and negative for strep, she did have some white spots in the back of the throat but resolved by now, no fever broke she just continue with cough.  She denies any shortness of breath or wheezing.  She tried Mucinex without any improvement.         Review of Systems   Constitutional: Negative.    HENT: Negative.     Eyes: Negative.    Respiratory:  Positive for cough and wheezing. Negative for apnea, chest tightness, shortness of breath and stridor.    Cardiovascular: Negative.    Gastrointestinal: Negative.    Genitourinary: Negative.    Neurological: Negative.    Hematological: Negative.    Psychiatric/Behavioral: Negative.         Objective   Wt 85.3 kg (188 lb)   BMI 29.89 kg/m²     Physical Exam  Constitutional:       General: She is not in acute distress.  Cardiovascular:      Rate and Rhythm: Normal rate.   Pulmonary:      Effort: No respiratory distress.   Neurological:      Mental Status: She is alert.         Assessment/Plan   Problem List Items Addressed This Visit              ICD-10-CM    Acute respiratory infection - Primary J22     Most likely viral respiratory infection and now has postviral cough syndrome.  Will treat with Medrol Dosepak.  I wanted to send Tessalon Perles and Proventil inhaler, she declined because they did not help her in the past and she does not like to use inhalers.  Advised her to use Delsym over-the-counter for cough suppressant.  Call me in 2 to 3 days if not better.         Relevant Medications    methylPREDNISolone (Medrol Dospak) 4 mg tablets    Acute cough R05.1    Relevant Medications    methylPREDNISolone (Medrol Dospak) 4 mg tablets

## 2024-04-12 NOTE — ASSESSMENT & PLAN NOTE
Most likely viral respiratory infection and now has postviral cough syndrome.  Will treat with Medrol Dosepak.  I wanted to send Tessalon Perles and Proventil inhaler, she declined because they did not help her in the past and she does not like to use inhalers.  Advised her to use Delsym over-the-counter for cough suppressant.  Call me in 2 to 3 days if not better.

## 2024-04-18 ENCOUNTER — TELEPHONE (OUTPATIENT)
Dept: PRIMARY CARE | Facility: CLINIC | Age: 39
End: 2024-04-18
Payer: COMMERCIAL

## 2024-04-18 DIAGNOSIS — R05.1 ACUTE COUGH: ICD-10-CM

## 2024-04-18 DIAGNOSIS — J22 ACUTE RESPIRATORY INFECTION: Primary | ICD-10-CM

## 2024-04-18 RX ORDER — HYDROCODONE BITARTRATE AND HOMATROPINE METHYLBROMIDE ORAL SOLUTION 5; 1.5 MG/5ML; MG/5ML
5 LIQUID ORAL EVERY 6 HOURS PRN
Qty: 100 ML | Refills: 0 | Status: SHIPPED | OUTPATIENT
Start: 2024-04-18 | End: 2024-04-23

## 2024-04-18 RX ORDER — AZITHROMYCIN 250 MG/1
TABLET, FILM COATED ORAL DAILY
Qty: 6 TABLET | Refills: 0 | Status: SHIPPED | OUTPATIENT
Start: 2024-04-18 | End: 2024-04-23

## 2024-04-18 NOTE — TELEPHONE ENCOUNTER
Pt called stating [place] canovist with Dr. Salomon, who told her to call back if not better. Pt has complete medication that was prescribed, but still had cough.    Pharmacy=  [facility] in [facility]    Please call pt at  [phone]

## 2024-04-24 DIAGNOSIS — F90.9 ADULT ADHD: ICD-10-CM

## 2024-04-24 RX ORDER — DEXTROAMPHETAMINE SACCHARATE, AMPHETAMINE ASPARTATE, DEXTROAMPHETAMINE SULFATE AND AMPHETAMINE SULFATE 7.5; 7.5; 7.5; 7.5 MG/1; MG/1; MG/1; MG/1
30 TABLET ORAL 2 TIMES DAILY
Qty: 60 TABLET | Refills: 0 | Status: SHIPPED | OUTPATIENT
Start: 2024-04-24 | End: 2024-05-22 | Stop reason: SDUPTHER

## 2024-05-14 NOTE — PROGRESS NOTES
"Subjective   Patient ID: Ninfa Diaz is a 38 y.o. female who presents for URI.    She is here for URI symptoms intermittent for last 2-3 days pt has c.,o sinus congestion, sinus pressure, drainage, sore throat, white spots on tonsils. There is cough occasionally productive of greenish mucus-though primarily this is associated with her sore throat. Denies wheezing, SOB, or fever. Pt has taken a covid test and it was negative. Works at [employer] part time so possible there are sick contacts there. No recent travel. Reports drinking a lot of water during the day.          Review of Systems   Constitutional:  Negative for chills, diaphoresis and fever.   HENT:  Positive for postnasal drip, rhinorrhea, sinus pressure, sore throat, trouble swallowing (no trouble keeping food or liquid down) and voice change (raspy). Negative for ear discharge, ear pain, hearing loss and sinus pain.         Ear itching mostly in left ear   Eyes:  Negative for visual disturbance.   Respiratory:  Negative for shortness of breath and wheezing.    Cardiovascular:  Negative for chest pain.   Gastrointestinal:  Positive for nausea (yesterday,not today). Negative for diarrhea and vomiting.   Endocrine: Negative for cold intolerance and heat intolerance.   Genitourinary:  Negative for dysuria.   Neurological:  Positive for light-headedness (With activity). Negative for dizziness.       Objective   /86   Pulse 83   Temp 37.1 °C (98.8 °F) (Tympanic)   Resp 16   Ht 1.689 m (5' 6.5\")   Wt 88 kg (194 lb)   SpO2 98%   BMI 30.84 kg/m²     Physical Exam  Vitals reviewed.   HENT:      Head: Normocephalic.      Right Ear: Tympanic membrane, ear canal and external ear normal. There is no impacted cerumen.      Left Ear: Tympanic membrane, ear canal and external ear normal. There is no impacted cerumen.      Mouth/Throat:      Mouth: Mucous membranes are moist.      Pharynx: Oropharyngeal exudate (mild white exudate over " Thank you for visiting today!     Please follow-up in 3 months     Please try and eat healthy, get at least 30 minutes of cardiovascular exercise a day to help keep your health as best as it can be.    If you have any questions or concerns please feel free to contact us at [phone].    If you feel like you are experiencing a medical emergency please seek immediate medical attention at an urgent care or in the emergency department.     bilateral tonsils. No lesions on soft palate or tongue) present. No posterior oropharyngeal erythema.   Cardiovascular:      Rate and Rhythm: Normal rate and regular rhythm.   Pulmonary:      Effort: Pulmonary effort is normal. No respiratory distress.      Breath sounds: Normal breath sounds.   Abdominal:      General: There is no distension.   Musculoskeletal:         General: No deformity.      Cervical back: Neck supple. No tenderness.   Lymphadenopathy:      Cervical: No cervical adenopathy.   Skin:     Coloration: Skin is not jaundiced.   Neurological:      Mental Status: She is alert.   Psychiatric:         Mood and Affect: Mood normal.         Behavior: Behavior normal.         Assessment/Plan   Problem List Items Addressed This Visit       Sore throat     Other Visit Diagnoses       Acute upper respiratory infection    -  Primary    Relevant Medications    azithromycin (Zithromax) 250 mg tablet          Acute URI  Sore throat  -Symptoms seem c/w URI and likely strep pharyngitis clinically  -Zpack sent to pharmacy given penicillin allergy.   -Plenty of fluids and rest. Consider flonase for a few weeks, nasal saline rinses.   -F/u 1-2 weeks if not much better  -F/u Dr. Salomon as previously scheduled.

## 2024-05-22 DIAGNOSIS — F90.9 ADULT ADHD: ICD-10-CM

## 2024-05-22 RX ORDER — DEXTROAMPHETAMINE SACCHARATE, AMPHETAMINE ASPARTATE, DEXTROAMPHETAMINE SULFATE AND AMPHETAMINE SULFATE 7.5; 7.5; 7.5; 7.5 MG/1; MG/1; MG/1; MG/1
TABLET ORAL
Qty: 60 TABLET | Refills: 0 | Status: SHIPPED | OUTPATIENT
Start: 2024-05-22

## 2024-06-06 ENCOUNTER — HOSPITAL ENCOUNTER (OUTPATIENT)
Dept: RADIOLOGY | Facility: CLINIC | Age: 39
Discharge: HOME | End: 2024-06-06
Payer: COMMERCIAL

## 2024-06-06 VITALS — BODY MASS INDEX: 30.22 KG/M2 | HEIGHT: 66 IN | WEIGHT: 188.05 LBS

## 2024-06-06 DIAGNOSIS — Z12.39 ENCOUNTER FOR SCREENING FOR MALIGNANT NEOPLASM OF BREAST, UNSPECIFIED SCREENING MODALITY: ICD-10-CM

## 2024-06-06 PROCEDURE — 77067 SCR MAMMO BI INCL CAD: CPT | Performed by: STUDENT IN AN ORGANIZED HEALTH CARE EDUCATION/TRAINING PROGRAM

## 2024-06-06 PROCEDURE — 77067 SCR MAMMO BI INCL CAD: CPT

## 2024-06-06 PROCEDURE — 77063 BREAST TOMOSYNTHESIS BI: CPT | Performed by: STUDENT IN AN ORGANIZED HEALTH CARE EDUCATION/TRAINING PROGRAM

## 2024-06-12 DIAGNOSIS — R92.8 ABNORMAL MAMMOGRAM: Primary | ICD-10-CM

## 2024-06-17 ENCOUNTER — TELEPHONE (OUTPATIENT)
Dept: PRIMARY CARE | Facility: CLINIC | Age: 39
End: 2024-06-17
Payer: COMMERCIAL

## 2024-06-17 NOTE — TELEPHONE ENCOUNTER
I called patient to discuss her mammogram which showed focal asymmetry, she already made an appointment for diagnostic mammogram and ultrasound to be done on 28 June at  but different location than [facility]. all question answered.

## 2024-06-21 DIAGNOSIS — F90.9 ADULT ADHD: ICD-10-CM

## 2024-06-21 RX ORDER — DEXTROAMPHETAMINE SACCHARATE, AMPHETAMINE ASPARTATE, DEXTROAMPHETAMINE SULFATE AND AMPHETAMINE SULFATE 7.5; 7.5; 7.5; 7.5 MG/1; MG/1; MG/1; MG/1
TABLET ORAL
Qty: 60 TABLET | Refills: 0 | Status: SHIPPED | OUTPATIENT
Start: 2024-06-21

## 2024-06-28 ENCOUNTER — HOSPITAL ENCOUNTER (OUTPATIENT)
Dept: RADIOLOGY | Facility: CLINIC | Age: 39
Discharge: HOME | End: 2024-06-28
Payer: COMMERCIAL

## 2024-06-28 ENCOUNTER — APPOINTMENT (OUTPATIENT)
Dept: RADIOLOGY | Facility: CLINIC | Age: 39
End: 2024-06-28
Payer: COMMERCIAL

## 2024-06-28 VITALS — HEIGHT: 66 IN | BODY MASS INDEX: 30.22 KG/M2 | WEIGHT: 188.05 LBS

## 2024-06-28 DIAGNOSIS — R92.8 ABNORMAL MAMMOGRAM: ICD-10-CM

## 2024-06-28 PROCEDURE — 77061 BREAST TOMOSYNTHESIS UNI: CPT | Mod: RT

## 2024-07-05 ENCOUNTER — APPOINTMENT (OUTPATIENT)
Dept: PRIMARY CARE | Facility: CLINIC | Age: 39
End: 2024-07-05
Payer: COMMERCIAL

## 2024-07-12 ENCOUNTER — APPOINTMENT (OUTPATIENT)
Dept: PRIMARY CARE | Facility: CLINIC | Age: 39
End: 2024-07-12
Payer: COMMERCIAL

## 2024-07-16 DIAGNOSIS — F90.9 ADULT ADHD: ICD-10-CM

## 2024-07-16 RX ORDER — DEXTROAMPHETAMINE SACCHARATE, AMPHETAMINE ASPARTATE, DEXTROAMPHETAMINE SULFATE AND AMPHETAMINE SULFATE 7.5; 7.5; 7.5; 7.5 MG/1; MG/1; MG/1; MG/1
TABLET ORAL
Qty: 60 TABLET | Refills: 0 | Status: SHIPPED | OUTPATIENT
Start: 2024-07-16

## 2024-07-23 DIAGNOSIS — Z30.49 ENCOUNTER FOR SURVEILLANCE OF OTHER CONTRACEPTIVE: ICD-10-CM

## 2024-07-24 RX ORDER — NORGESTIMATE AND ETHINYL ESTRADIOL 0.25-0.035
1 KIT ORAL DAILY
Qty: 84 TABLET | Refills: 0 | Status: SHIPPED | OUTPATIENT
Start: 2024-07-24

## 2024-08-02 ENCOUNTER — TELEPHONE (OUTPATIENT)
Dept: PRIMARY CARE | Facility: CLINIC | Age: 39
End: 2024-08-02
Payer: COMMERCIAL

## 2024-08-02 DIAGNOSIS — E03.9 HYPOTHYROIDISM, UNSPECIFIED: ICD-10-CM

## 2024-08-02 RX ORDER — LEVOTHYROXINE SODIUM 50 UG/1
50 TABLET ORAL DAILY
Qty: 90 TABLET | Refills: 3 | Status: SHIPPED | OUTPATIENT
Start: 2024-08-02

## 2024-08-02 NOTE — TELEPHONE ENCOUNTER
Pt called stating pharmacy has faxed us. Pt stated is almost out of her thyroid medicine.    [facility] [address]    Please call pt at  [phone]

## 2024-08-20 DIAGNOSIS — F90.9 ADULT ADHD: ICD-10-CM

## 2024-08-20 RX ORDER — DEXTROAMPHETAMINE SACCHARATE, AMPHETAMINE ASPARTATE, DEXTROAMPHETAMINE SULFATE AND AMPHETAMINE SULFATE 7.5; 7.5; 7.5; 7.5 MG/1; MG/1; MG/1; MG/1
TABLET ORAL
Qty: 60 TABLET | Refills: 0 | Status: SHIPPED | OUTPATIENT
Start: 2024-08-20

## 2024-09-13 ENCOUNTER — APPOINTMENT (OUTPATIENT)
Dept: OBSTETRICS AND GYNECOLOGY | Facility: CLINIC | Age: 39
End: 2024-09-13
Payer: COMMERCIAL

## 2024-09-16 DIAGNOSIS — F90.9 ADULT ADHD: ICD-10-CM

## 2024-09-17 RX ORDER — DEXTROAMPHETAMINE SACCHARATE, AMPHETAMINE ASPARTATE, DEXTROAMPHETAMINE SULFATE AND AMPHETAMINE SULFATE 7.5; 7.5; 7.5; 7.5 MG/1; MG/1; MG/1; MG/1
TABLET ORAL
Qty: 60 TABLET | Refills: 0 | Status: SHIPPED | OUTPATIENT
Start: 2024-09-17

## 2024-09-20 ENCOUNTER — APPOINTMENT (OUTPATIENT)
Dept: PRIMARY CARE | Facility: CLINIC | Age: 39
End: 2024-09-20
Payer: COMMERCIAL

## 2024-09-26 ENCOUNTER — APPOINTMENT (OUTPATIENT)
Dept: OBSTETRICS AND GYNECOLOGY | Facility: CLINIC | Age: 39
End: 2024-09-26
Payer: COMMERCIAL

## 2024-09-26 VITALS
HEIGHT: 67 IN | SYSTOLIC BLOOD PRESSURE: 110 MMHG | BODY MASS INDEX: 30.61 KG/M2 | WEIGHT: 195 LBS | DIASTOLIC BLOOD PRESSURE: 70 MMHG

## 2024-09-26 DIAGNOSIS — Z30.49 ENCOUNTER FOR SURVEILLANCE OF OTHER CONTRACEPTIVE: ICD-10-CM

## 2024-09-26 DIAGNOSIS — Z01.419 WELL WOMAN EXAM: ICD-10-CM

## 2024-09-26 DIAGNOSIS — N89.8 VAGINAL ODOR: Primary | ICD-10-CM

## 2024-09-26 DIAGNOSIS — Z11.3 ROUTINE SCREENING FOR STI (SEXUALLY TRANSMITTED INFECTION): ICD-10-CM

## 2024-09-26 PROCEDURE — 99396 PREV VISIT EST AGE 40-64: CPT | Performed by: ADVANCED PRACTICE MIDWIFE

## 2024-09-26 PROCEDURE — 3008F BODY MASS INDEX DOCD: CPT | Performed by: ADVANCED PRACTICE MIDWIFE

## 2024-09-26 PROCEDURE — 87591 N.GONORRHOEAE DNA AMP PROB: CPT

## 2024-09-26 PROCEDURE — 87205 SMEAR GRAM STAIN: CPT

## 2024-09-26 PROCEDURE — 1036F TOBACCO NON-USER: CPT | Performed by: ADVANCED PRACTICE MIDWIFE

## 2024-09-26 PROCEDURE — 87661 TRICHOMONAS VAGINALIS AMPLIF: CPT

## 2024-09-26 PROCEDURE — 87491 CHLMYD TRACH DNA AMP PROBE: CPT

## 2024-09-26 RX ORDER — NORGESTIMATE AND ETHINYL ESTRADIOL 0.25-0.035
1 KIT ORAL DAILY
Qty: 84 TABLET | Refills: 3 | Status: SHIPPED | OUTPATIENT
Start: 2024-09-26

## 2024-09-26 ASSESSMENT — PAIN SCALES - GENERAL: PAINLEVEL: 0-NO PAIN

## 2024-09-26 NOTE — ASSESSMENT & PLAN NOTE
Pap due 1 year  Up to date with mammogram    Discussed she can skip placebo week, discussed possibility of BTB.     Put a note in chart re: prior pregnancy outcome to not discuss.   Reports she may be considering pregnancy, discussed risk factors with age. Discussed can stop OCP when desired to attempt pregnancy.

## 2024-09-26 NOTE — PROGRESS NOTES
[facility] ( Breast and Pelvic exam ) Vaginal odor    Last pap: 4.6.2020 ( wnl -hpv )  Last mammo: 6.28.24 ( NEG )    Chaperone declined: La Vogel, CMA3    Assessment/Plan   Problem List Items Addressed This Visit             ICD-10-CM       Medium    Routine screening for STI (sexually transmitted infection) Z11.3    Vaginal odor - Primary N89.8     Vaginitis collected          Well woman exam Z01.419     Pap due 1 year  Up to date with mammogram    Discussed she can skip placebo week, discussed possibility of BTB.     Put a note in chart re: prior pregnancy outcome to not discuss.   Reports she may be considering pregnancy, discussed risk factors with age. Discussed can stop OCP when desired to attempt pregnancy.           Other Visit Diagnoses         Codes    Encounter for surveillance of other contraceptive     Z30.49            SARAH Elizalde-TRISTAN Pacheco   Ninfa Diaz is a 39 y.o. female who is here for a routine exam.     Stopped Phentermine last year @ [facility], was initially down 18#  Working with endocrine re: continued weight loss  Reports her issue is her eating habits    Going to [place] soon, wondering about skipping placebo week     Reports ? Vaginal odor  No itching    Lives with: partner   Current employment: finance   T/E/D: never/rare ETOH/never   Up to date vision/dental: yes  PCP: Dr. Salomon   Menstrual history: regular on OCP   Sexual history: monogamous male partner x 3 years   Denies trauma or violence   Pregnancy history:  G/P 1  T: P:  EAB:  1  Ectopic:   SAB:   L:    EAB x 1, wants to keep in confidential     Diet: working with nutrition   Exercise: [employer]     PMH, PSH, and Family hx reviewed and updated    Current contraception: OCP (estrogen/progesterone)  Refill Y/N:    Last pap: 2020, NILM/neg HPV   History of abnormal Pap smear: no  Family history of breast, uterine,  ovarian cancer:maternal aunt   Regular self breast exam: no  Last mammogram:  "-06/2024  History of abnormal mammogram: no      Review of Systems    Objective   /70   Ht 1.689 m (5' 6.5\")   Wt 88.5 kg (195 lb)   LMP 08/30/2024   BMI 31.00 kg/m²     Physical Exam  Constitutional:       Appearance: Normal appearance.   HENT:      Head: Normocephalic.   Neck:      Thyroid: No thyroid mass, thyromegaly or thyroid tenderness.   Cardiovascular:      Rate and Rhythm: Normal rate and regular rhythm.   Pulmonary:      Effort: Pulmonary effort is normal.      Breath sounds: Normal breath sounds.   Chest:   Breasts:     Right: Normal. No mass, nipple discharge or tenderness.      Left: Normal. No mass, nipple discharge or tenderness.   Abdominal:      Palpations: Abdomen is soft.   Genitourinary:     Vagina: Normal.      Cervix: Normal.   Musculoskeletal:         General: Normal range of motion.   Lymphadenopathy:      Upper Body:      Right upper body: No axillary adenopathy.      Left upper body: No axillary adenopathy.   Skin:     General: Skin is warm and dry.   Neurological:      Mental Status: She is alert and oriented to person, place, and time.   Psychiatric:         Mood and Affect: Mood normal.         "

## 2024-09-27 LAB
C TRACH RRNA SPEC QL NAA+PROBE: NEGATIVE
N GONORRHOEA DNA SPEC QL PROBE+SIG AMP: NEGATIVE
T VAGINALIS RRNA SPEC QL NAA+PROBE: NEGATIVE

## 2024-09-29 LAB
BACTERIAL VAGINOSIS VAG-IMP: NORMAL
CLUE CELLS VAG LPF-#/AREA: NORMAL /[LPF]
NUGENT SCORE: 4
YEAST VAG WET PREP-#/AREA: NORMAL

## 2024-10-08 ENCOUNTER — APPOINTMENT (OUTPATIENT)
Dept: PRIMARY CARE | Facility: CLINIC | Age: 39
End: 2024-10-08
Payer: COMMERCIAL

## 2024-10-14 DIAGNOSIS — F90.9 ADULT ADHD: ICD-10-CM

## 2024-10-14 RX ORDER — DEXTROAMPHETAMINE SACCHARATE, AMPHETAMINE ASPARTATE, DEXTROAMPHETAMINE SULFATE AND AMPHETAMINE SULFATE 7.5; 7.5; 7.5; 7.5 MG/1; MG/1; MG/1; MG/1
TABLET ORAL
Qty: 60 TABLET | Refills: 0 | Status: SHIPPED | OUTPATIENT
Start: 2024-10-14

## 2024-11-15 ENCOUNTER — LAB (OUTPATIENT)
Dept: LAB | Facility: LAB | Age: 39
End: 2024-11-15
Payer: COMMERCIAL

## 2024-11-15 ENCOUNTER — APPOINTMENT (OUTPATIENT)
Dept: PRIMARY CARE | Facility: CLINIC | Age: 39
End: 2024-11-15
Payer: COMMERCIAL

## 2024-11-15 VITALS
TEMPERATURE: 98.6 F | HEART RATE: 77 BPM | SYSTOLIC BLOOD PRESSURE: 130 MMHG | HEIGHT: 66 IN | DIASTOLIC BLOOD PRESSURE: 70 MMHG | WEIGHT: 202.8 LBS | OXYGEN SATURATION: 97 % | BODY MASS INDEX: 32.59 KG/M2

## 2024-11-15 DIAGNOSIS — Z00.00 HEALTH CARE MAINTENANCE: ICD-10-CM

## 2024-11-15 DIAGNOSIS — Z79.899 HIGH RISK MEDICATION USE: ICD-10-CM

## 2024-11-15 DIAGNOSIS — F32.A MILD DEPRESSION: ICD-10-CM

## 2024-11-15 DIAGNOSIS — J22 ACUTE RESPIRATORY INFECTION: ICD-10-CM

## 2024-11-15 DIAGNOSIS — C43.59 MALIGNANT MELANOMA OF TRUNK: ICD-10-CM

## 2024-11-15 DIAGNOSIS — E03.9 ACQUIRED HYPOTHYROIDISM: Chronic | ICD-10-CM

## 2024-11-15 DIAGNOSIS — E04.0 SIMPLE GOITER: ICD-10-CM

## 2024-11-15 DIAGNOSIS — F90.9 ADULT ADHD: Primary | Chronic | ICD-10-CM

## 2024-11-15 LAB
ALBUMIN SERPL BCP-MCNC: 4.3 G/DL (ref 3.4–5)
ALP SERPL-CCNC: 57 U/L (ref 33–110)
ALT SERPL W P-5'-P-CCNC: 9 U/L (ref 7–45)
ANION GAP SERPL CALC-SCNC: 13 MMOL/L (ref 10–20)
AST SERPL W P-5'-P-CCNC: 11 U/L (ref 9–39)
BILIRUB SERPL-MCNC: 0.4 MG/DL (ref 0–1.2)
BUN SERPL-MCNC: 17 MG/DL (ref 6–23)
CALCIUM SERPL-MCNC: 9.1 MG/DL (ref 8.6–10.6)
CHLORIDE SERPL-SCNC: 100 MMOL/L (ref 98–107)
CHOLEST SERPL-MCNC: 207 MG/DL (ref 0–199)
CHOLESTEROL/HDL RATIO: 2.5
CO2 SERPL-SCNC: 26 MMOL/L (ref 21–32)
CREAT SERPL-MCNC: 0.72 MG/DL (ref 0.5–1.05)
EGFRCR SERPLBLD CKD-EPI 2021: >90 ML/MIN/1.73M*2
ERYTHROCYTE [DISTWIDTH] IN BLOOD BY AUTOMATED COUNT: 11.9 % (ref 11.5–14.5)
GLUCOSE SERPL-MCNC: 95 MG/DL (ref 74–99)
HCT VFR BLD AUTO: 41 % (ref 36–46)
HDLC SERPL-MCNC: 81.6 MG/DL
HGB BLD-MCNC: 13.1 G/DL (ref 12–16)
LDLC SERPL CALC-MCNC: 98 MG/DL
MCH RBC QN AUTO: 30.4 PG (ref 26–34)
MCHC RBC AUTO-ENTMCNC: 32 G/DL (ref 32–36)
MCV RBC AUTO: 95 FL (ref 80–100)
NON HDL CHOLESTEROL: 125 MG/DL (ref 0–149)
NRBC BLD-RTO: 0 /100 WBCS (ref 0–0)
PLATELET # BLD AUTO: 383 X10*3/UL (ref 150–450)
POTASSIUM SERPL-SCNC: 4.7 MMOL/L (ref 3.5–5.3)
PROT SERPL-MCNC: 7 G/DL (ref 6.4–8.2)
RBC # BLD AUTO: 4.31 X10*6/UL (ref 4–5.2)
SODIUM SERPL-SCNC: 134 MMOL/L (ref 136–145)
TRIGL SERPL-MCNC: 137 MG/DL (ref 0–149)
TSH SERPL-ACNC: 3.56 MIU/L (ref 0.44–3.98)
VLDL: 27 MG/DL (ref 0–40)
WBC # BLD AUTO: 9.2 X10*3/UL (ref 4.4–11.3)

## 2024-11-15 PROCEDURE — 85027 COMPLETE CBC AUTOMATED: CPT

## 2024-11-15 PROCEDURE — 1036F TOBACCO NON-USER: CPT | Performed by: INTERNAL MEDICINE

## 2024-11-15 PROCEDURE — 36415 COLL VENOUS BLD VENIPUNCTURE: CPT

## 2024-11-15 PROCEDURE — 99214 OFFICE O/P EST MOD 30 MIN: CPT | Performed by: INTERNAL MEDICINE

## 2024-11-15 PROCEDURE — 3008F BODY MASS INDEX DOCD: CPT | Performed by: INTERNAL MEDICINE

## 2024-11-15 PROCEDURE — 84443 ASSAY THYROID STIM HORMONE: CPT

## 2024-11-15 PROCEDURE — 80053 COMPREHEN METABOLIC PANEL: CPT

## 2024-11-15 PROCEDURE — 80061 LIPID PANEL: CPT

## 2024-11-15 RX ORDER — NALTREXONE HYDROCHLORIDE 50 MG/1
25 TABLET, FILM COATED ORAL
COMMUNITY
Start: 2024-11-06

## 2024-11-15 RX ORDER — FLUTICASONE PROPIONATE 50 MCG
2 SPRAY, SUSPENSION (ML) NASAL DAILY
Qty: 16 G | Refills: 0 | Status: SHIPPED | OUTPATIENT
Start: 2024-11-15 | End: 2025-11-15

## 2024-11-15 RX ORDER — BUPROPION HYDROCHLORIDE 150 MG/1
150 TABLET, EXTENDED RELEASE ORAL 2 TIMES DAILY
COMMUNITY
Start: 2024-11-06

## 2024-11-15 RX ORDER — SULFAMETHOXAZOLE AND TRIMETHOPRIM 800; 160 MG/1; MG/1
1 TABLET ORAL 2 TIMES DAILY
Qty: 10 TABLET | Refills: 0 | Status: SHIPPED | OUTPATIENT
Start: 2024-11-15 | End: 2024-11-20

## 2024-11-15 RX ORDER — DEXTROAMPHETAMINE SACCHARATE, AMPHETAMINE ASPARTATE, DEXTROAMPHETAMINE SULFATE AND AMPHETAMINE SULFATE 7.5; 7.5; 7.5; 7.5 MG/1; MG/1; MG/1; MG/1
TABLET ORAL
Qty: 60 TABLET | Refills: 0 | Status: SHIPPED | OUTPATIENT
Start: 2024-11-15

## 2024-11-15 ASSESSMENT — PATIENT HEALTH QUESTIONNAIRE - PHQ9
2. FEELING DOWN, DEPRESSED OR HOPELESS: NOT AT ALL
1. LITTLE INTEREST OR PLEASURE IN DOING THINGS: NOT AT ALL
SUM OF ALL RESPONSES TO PHQ9 QUESTIONS 1 AND 2: 0

## 2024-11-15 NOTE — LETTER
November 15, 2024     Patient: Ninfa Diaz   YOB: 1985   Date of Visit: 11/15/2024       To Whom It May Concern:    Ninfa Diaz was seen in my clinic on 11/15/2024 at 8:45 am. Please excuse Ninfa for her absence from work 11/8/2024 and 11/15/2024-11/17/2024. Patient is able to return to work Monday 11/18/2024 with no restrictions.     If you have any questions or concerns, please don't hesitate to call.         Sincerely,         Amy Salomon MD        CC: No Recipients

## 2024-11-15 NOTE — PROGRESS NOTES
"Subjective   Patient ID: Ninfa Diaz is a 39 y.o. female who presents for Follow-up (Patient is here for a 6 month follow up. Patient has been sick for a couple of weeks. Patient took two COVID tests and they were both negative. Patient complains of weakness, no fever, congestion, and green mucus. Patient has been on two antibiotics and is currently taking Bactrim. ).    here to f/u on depression,ADHD,hypothyroid and obesity,she stopped drinking alcohol and saw nutritionist and endo at [facility].she is on Naltrexone in addition to Wellbutrin.  She also has respiratory symptoms,seen at ,treated with Doxycycline without improvement,then she started taking her boyfrien Bactrim DS with some response,today day 3,her covid ,flu test were negative.  Urine drug screen 3/13/2024.         Review of Systems   Constitutional: Negative.    HENT:  Positive for congestion, ear pain and sinus pain.         Greenish secretions.   Eyes: Negative.    Respiratory:  Positive for cough.    Cardiovascular: Negative.    Gastrointestinal: Negative.    Genitourinary: Negative.    Neurological: Negative.    Hematological: Negative.    Psychiatric/Behavioral: Negative.         Objective   /70 (BP Location: Left arm, Patient Position: Sitting)   Pulse 77   Temp 37 °C (98.6 °F) (Temporal)   Ht 1.664 m (5' 5.5\")   Wt 92 kg (202 lb 12.8 oz)   SpO2 97%   BMI 33.23 kg/m²     Physical Exam  Vitals reviewed.   Constitutional:       Appearance: Normal appearance. She is obese.   HENT:      Head: Normocephalic and atraumatic.      Right Ear: Ear canal normal.      Left Ear: Tympanic membrane and ear canal normal.      Ears:      Comments: Dull TM right side.     Nose: Congestion present.      Comments: Pressure over frontal sinus area with palpation.  Eyes:      Extraocular Movements: Extraocular movements intact.      Pupils: Pupils are equal, round, and reactive to light.   Neck:      Vascular: No carotid bruit.   Cardiovascular:      Rate " and Rhythm: Normal rate and regular rhythm.      Pulses: Normal pulses.      Heart sounds: Normal heart sounds. No murmur heard.  Pulmonary:      Effort: Pulmonary effort is normal.      Breath sounds: Normal breath sounds.   Musculoskeletal:         General: Normal range of motion.      Cervical back: Normal range of motion and neck supple.   Lymphadenopathy:      Cervical: No cervical adenopathy.   Neurological:      General: No focal deficit present.      Mental Status: She is alert and oriented to person, place, and time.   Psychiatric:         Mood and Affect: Mood normal.         Assessment/Plan   Problem List Items Addressed This Visit             ICD-10-CM    Acute respiratory infection J22     Will extend Bactrim DS for total of 5 days, patient improving with this medication.  Add Flonase nasal spray.  Call in 1 week if not better.         Relevant Medications    sulfamethoxazole-trimethoprim (Bactrim DS) 800-160 mg tablet    fluticasone (Flonase) 50 mcg/actuation nasal spray    Adult ADHD - Primary (Chronic) F90.9     CS agreement up to date.  Order urine drug screen.  Continue same Adderall   Follow-up in 6 months.         Relevant Medications    amphetamine-dextroamphetamine (Adderall) 30 mg tablet    Hypothyroid (Chronic) E03.9    Mild depression F32.A     Stable on Wellbutrin.         Malignant melanoma of trunk C43.59     Seen by derm.         Simple goiter E04.0    High risk medication use Z79.899    Relevant Orders    Drug Screen, Urine With Reflex to Confirmation (Completed)     Other Visit Diagnoses         Codes    Health care maintenance     Z00.00    Relevant Orders    CBC (Completed)    Comprehensive Metabolic Panel (Completed)    Lipid Panel (Completed)    TSH with reflex to Free T4 if abnormal (Completed)

## 2024-11-17 PROBLEM — F10.929 ALCOHOLIC INTOXICATION (CMS-HCC): Status: RESOLVED | Noted: 2024-03-13 | Resolved: 2024-11-17

## 2024-11-17 ASSESSMENT — ENCOUNTER SYMPTOMS
CONSTITUTIONAL NEGATIVE: 1
HEMATOLOGIC/LYMPHATIC NEGATIVE: 1
CARDIOVASCULAR NEGATIVE: 1
SINUS PAIN: 1
GASTROINTESTINAL NEGATIVE: 1
EYES NEGATIVE: 1
NEUROLOGICAL NEGATIVE: 1
COUGH: 1
PSYCHIATRIC NEGATIVE: 1

## 2024-11-18 NOTE — ASSESSMENT & PLAN NOTE
Problem: Mobility Impaired (Adult and Pediatric)  Goal: *Therapy Goal (Edit Goal, Insert Text)  Description: Physical Therapy Short Term Goals  Initiated 4/5/2022, re-assessed 4/19/2022 and to be accomplished within 7 day(s) on 4/26/2022  1. Patient will move from supine to sit and sit to supine , scoot up and down, and roll side to side in bed with standby assistance. (MET 4/19/2022)  2. Patient will transfer from bed to chair and chair to bed with moderate assistance  using the least restrictive device. (MET 4/19/2022)  3. Patient will perform sit to stand with moderate assistance . (MET 4/19/2022)  4. Patient will ambulate with moderate assistance  for 25 feet with the least restrictive device. (MET 4/19/2022)  5. Patient will perform w/c mobility at supervision level for 150 ft over even surfaces. (MET 4/19/2022)  6. Patient will be able to participate in stairs negotiation assessment. (MET 4/19/2022)    Physical Therapy Long Term Goals  Initiated 4/5/2022 and to be accomplished within 28 day(s) on 5/3/2022  1. Patient will move from supine to sit and sit to supine , scoot up and down, and roll side to side in bed with modified independence. 2.  Patient will transfer from bed to chair and chair to bed with supervision/set-up using the least restrictive device. 3.  Patient will perform sit to stand with supervision/set-up. 4.  Patient will ambulate with supervision/set-up for 50 feet with the least restrictive device. 5.  Patient will ascend/descend 5 stairs with 1 handrail(s) (right side ascending) with contact guard assistance. Outcome: Progressing Towards Goal   PHYSICAL THERAPY TREATMENT    Patient: Todd Manning (85 y.o. 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     Time In:1054  Time NRS:2932    Patient seen for: Stable on Wellbutrin.   Gait training;Transfer training; Therapeutic exercise (bed mobility, stair training)    Pain:  Pt pain was reported as no c/o pre-treatment. Pt pain was reported as no c/o post-treatment. Intervention: NA     Patient identified with name and : yes     SUBJECTIVE:      Pt reports feeling stiff at start of session and reports clonus has been really bad the last 2 days when he's resting in his room, being elicited by most any movement of left LE. OBJECTIVE DATA SUMMARY:    Objective:     BED/MAT MOBILITY Daily Assessment     Rolling Right : 4 (Contact guard assistance)  Rolling Left : 5 (Stand-by assistance)  Supine to Sit : 5 (Supervision)  Sit to Supine : 5 (Supervision)  Pt performed bed mobility on left side of mat table. Pt performed sit to supine with supervision and self assisting left LE with hooking right LE under to clear EOM. Pt required CGA with left LE to roll to right side with pt pulling on EOM with right hand. Pt rolled to left side with SBA and left sidelying to sitting with supervision. TRANSFERS Daily Assessment     Transfer Type: Other  Other: stand step txfr with RW  Transfer Assistance : 4 (Minimal assistance)  Sit to Stand Assistance: Moderate assistance  Pt performed sit to stand from w/c pushing up from arm rest with right UE with CGA/min assist. Pt required min/mod assist for push off and balance support with sit to stand with B hands on distal femurs to increased dependence on BLE. Pt performed stand step txfr with RW using left  splint and min assist for safety and balance. GAIT Daily Assessment    Gait Description (WDL)      Gait Abnormalities Ataxic;Decreased step clearance; Step to gait;Trunk sway increased    Assistive device Walker, rolling;Gait belt    Ambulation assistance - level surface      Distance 80 Feet (ft) (50ft and 30ft)    Ambulation assistance- uneven surface      Comments Pt ambulated 50ft and after seated rest break 30ft with RW using left  splint. Pt was able to complete 80ft after seated rest break with RW with left  splint. Pt demo'd decreased left foot clearance and step to pattern. Pt demo'd improved left ankle DF but fatigues quickly causing increased trunk lean and wt shift to right to assist with left foot clearance. STEPS/STAIRS Daily Assessment     Steps/Stairs ambulated 4 (6\" steps)    Assistance Required 3 (Moderate assistance)    Rail Use Right     Comments  Pt negotiated up and down 4 6\" steps with min assist for balance to ascend using right HR and leading with right LE. Pt required min/mod assist to clear left foot/assist with left knee flexion. Pt performed side stepping with left HR to descend, leading with right LE and requiring mod assist to flex left knee to descend with left LE. Curbs/Ramps  NA        BALANCE Daily Assessment     Sitting - Static: Good (unsupported)  Sitting - Dynamic: Fair (occasional)  Standing - Static: Fair  Standing - Dynamic : Impaired        WHEELCHAIR MOBILITY Daily Assessment     Able to Propel (ft): 186 feet  Functional Level:  (Aimee)  Curbs/Ramps Assist Required (FIM Score): 0 (Not tested)  Wheelchair Setup Assist Required : 6 (Modified independent) (without use of leg rest)  Wheelchair Management: Manages left brake;Manages right brake  Pt propelled w/c back to room at end of session with BLE and right UE Aimee. THERAPEUTIC EXERCISES Daily Assessment       Supine bridging 2x10, SAQ 2x15, right sidelying left hip clamshell 2x10, left sidelying left knee flexion and hip flexion 2x15 on half shifter in gravity minimized position with min assist to achieve full ROM. Seated on EOM, BLE LAQ and hip flexion 2x15 with 2# on right LE        ASSESSMENT:  Pt is making good progress with improving functional mobility, stability and strengthening with left LE. Pt's activity tolerance has improved but pt continues to demo fatigue with increased clonus of left LE and ataxia of right LE. Progression toward goals:  []      Improving appropriately and progressing toward goals  [x]      Improving slowly and progressing toward goals  []      Not making progress toward goals and plan of care will be adjusted      PLAN:  Patient continues to benefit from skilled intervention to address the above impairments. Continue treatment per established plan of care. Discharge Recommendations:  Home Health  Further Equipment Recommendations for Discharge:  rolling walker and wheelchair 18 inch      Estimated Discharge Date: 5/3/2022    Activity Tolerance:   Fair+  Please refer to the flowsheet for vital signs taken during this treatment.     After treatment:   [] Patient left in no apparent distress in bed  [] Patient left in no apparent distress sitting up in chair  [x] Patient left in no apparent distress in w/c mobilizing under own power  [] Patient left in no apparent distress dining area  [] Patient left in no apparent distress mobilizing under own power  [] Call bell left within reach  [] Nursing notified  [] Caregiver present  [] Bed alarm activated   [x] Chair alarm activated      Mark Snider, PTA  4/20/2022

## 2024-11-18 NOTE — ASSESSMENT & PLAN NOTE
CS agreement up to date.  Order urine drug screen.  Continue same Adderall   Follow-up in 6 months.

## 2024-11-18 NOTE — ASSESSMENT & PLAN NOTE
Will extend Bactrim DS for total of 5 days, patient improving with this medication.  Add Flonase nasal spray.  Call in 1 week if not better.

## 2024-11-20 LAB
AMPHET UR-MCNC: >5000 NG/ML
MDA UR-MCNC: <200 NG/ML
MDEA UR-MCNC: <200 NG/ML
MDMA UR-MCNC: <200 NG/ML
METHAMPHET UR-MCNC: <200 NG/ML
PHENTERMINE UR CFM-MCNC: <200 NG/ML

## 2024-12-06 ENCOUNTER — APPOINTMENT (OUTPATIENT)
Dept: PRIMARY CARE | Facility: CLINIC | Age: 39
End: 2024-12-06
Payer: COMMERCIAL

## 2024-12-07 DIAGNOSIS — J22 ACUTE RESPIRATORY INFECTION: ICD-10-CM

## 2024-12-09 RX ORDER — FLUTICASONE PROPIONATE 50 MCG
SPRAY, SUSPENSION (ML) NASAL
Qty: 48 ML | Refills: 1 | Status: SHIPPED | OUTPATIENT
Start: 2024-12-09

## 2024-12-13 DIAGNOSIS — F90.9 ADULT ADHD: Chronic | ICD-10-CM

## 2024-12-13 RX ORDER — DEXTROAMPHETAMINE SACCHARATE, AMPHETAMINE ASPARTATE, DEXTROAMPHETAMINE SULFATE AND AMPHETAMINE SULFATE 7.5; 7.5; 7.5; 7.5 MG/1; MG/1; MG/1; MG/1
TABLET ORAL
Qty: 60 TABLET | Refills: 0 | Status: SHIPPED | OUTPATIENT
Start: 2024-12-13

## 2024-12-16 ENCOUNTER — OFFICE VISIT (OUTPATIENT)
Dept: PRIMARY CARE | Facility: CLINIC | Age: 39
End: 2024-12-16
Payer: COMMERCIAL

## 2024-12-16 VITALS
RESPIRATION RATE: 16 BRPM | DIASTOLIC BLOOD PRESSURE: 70 MMHG | TEMPERATURE: 96.8 F | SYSTOLIC BLOOD PRESSURE: 128 MMHG | OXYGEN SATURATION: 99 % | WEIGHT: 206 LBS | HEART RATE: 88 BPM | BODY MASS INDEX: 33.76 KG/M2

## 2024-12-16 DIAGNOSIS — J06.9 UPPER RESPIRATORY TRACT INFECTION, UNSPECIFIED TYPE: Primary | ICD-10-CM

## 2024-12-16 LAB
POC BINAX EXPIRATION: NORMAL
POC BINAX NOW COVID SERIAL NUMBER: NORMAL
POC RAPID STREP: NEGATIVE
POC SARS-COV-2 AG BINAX: NORMAL

## 2024-12-16 PROCEDURE — 87811 SARS-COV-2 COVID19 W/OPTIC: CPT | Performed by: INTERNAL MEDICINE

## 2024-12-16 PROCEDURE — 87880 STREP A ASSAY W/OPTIC: CPT | Performed by: INTERNAL MEDICINE

## 2024-12-16 PROCEDURE — 87081 CULTURE SCREEN ONLY: CPT

## 2024-12-16 PROCEDURE — 99214 OFFICE O/P EST MOD 30 MIN: CPT | Performed by: INTERNAL MEDICINE

## 2024-12-16 PROCEDURE — 87636 SARSCOV2 & INF A&B AMP PRB: CPT

## 2024-12-16 PROCEDURE — 1036F TOBACCO NON-USER: CPT | Performed by: INTERNAL MEDICINE

## 2024-12-16 RX ORDER — SULFAMETHOXAZOLE AND TRIMETHOPRIM 800; 160 MG/1; MG/1
1 TABLET ORAL 2 TIMES DAILY
Qty: 14 TABLET | Refills: 0 | Status: SHIPPED | OUTPATIENT
Start: 2024-12-16 | End: 2024-12-23

## 2024-12-16 ASSESSMENT — ENCOUNTER SYMPTOMS
SINUS PRESSURE: 1
MYALGIAS: 1
SORE THROAT: 1

## 2024-12-16 NOTE — PROGRESS NOTES
Patient here for a URI and sore throat     Subjective   Patient ID: Ninfa Diaz is a 39 y.o. female who presents for URI and Sore Throat.  She follows with  from Internal Medicine.    The patient reports recurrence of myalgia, congestion, sinus pressure, ear fullness, postnasal drip, and sore throat since 12/14/2024.  She recalls similar symptoms in 11/2024 which were treated with TMP/SMX, and Flonase.  She has been taking Tylenol resulting in partial relief.  The patient denies any sick contacts, and is allergic to amoxicillin.  She recalls taking doxycyline in the past with little to no benefit.     URI   Associated symptoms include congestion and a sore throat.   Sore Throat   Associated symptoms include congestion.     Review of Systems   HENT:  Positive for congestion, postnasal drip, sinus pressure and sore throat.         Positive for ear fullness.    Musculoskeletal:  Positive for myalgias.     Objective   Physical Exam  Constitutional:       Appearance: Normal appearance.   Neck:      Vascular: No carotid bruit.   Cardiovascular:      Rate and Rhythm: Normal rate and regular rhythm.      Heart sounds: Normal heart sounds.   Pulmonary:      Effort: Pulmonary effort is normal.      Breath sounds: Normal breath sounds.   Abdominal:      General: Bowel sounds are normal.      Palpations: Abdomen is soft.      Tenderness: There is no abdominal tenderness.   Skin:     General: Skin is warm and dry.   Neurological:      General: No focal deficit present.      Mental Status: She is alert and oriented to person, place, and time. Mental status is at baseline.   Psychiatric:         Mood and Affect: Mood normal.         Behavior: Behavior normal.       Assessment/Plan   Problem List Items Addressed This Visit    None  Visit Diagnoses         Codes    Upper respiratory tract infection, unspecified type    -  Primary J06.9    Relevant Medications    sulfamethoxazole-trimethoprim (Bactrim DS) 800-160 mg  tablet    Other Relevant Orders    POCT Rapid Strep A manually resulted (Completed)    Sars-CoV-2 and Influenza A/B PCR    POCT BinaxNOW Covid-19 Ag Card manually resulted (Completed)    Group A Streptococcus, Culture            IMPRESSIONS/PLAN:     URTI  - CTAB on exam.  IO Rapid Covid and Strep Tests both negative.  Ordered NP Swab Sars-CoV-2 and Influenza A/B PCR. Prescribed TMP/-800mg as 1 tablet BID, as this has worked well in the past.  Recommended patient to take Probiotic OTC, and/or Yogurt to avoid adverse effects.  Advised patient to switch to Motrin for symptom control to be taken as directed on packaging.  Drink plenty of water to remain well hydrated, and rest as much as possible.  Wrote Clinical Letter advising patient work from home through remainder of this week until 12/23/2024.  Call the clinic if symptoms persist or worsen.     /70 in the office today.    Follow-up according to routine health maintenance, call sooner if needed.        Scribe Attestation  By signing my name below, I, Donell Alejandro   attest that this documentation has been prepared under the direction and in the presence of Cain Clarke DO.   Temi Greco 12/16/24 2:34 PM    Cibinqo Pregnancy And Lactation Text: It is unknown if this medication will adversely affect pregnancy or breast feeding.  You should not take this medication if you are currently pregnant or planning a pregnancy or while breastfeeding.

## 2024-12-16 NOTE — LETTER
December 16, 2024     Patient: Ninfa Diaz   YOB: 1985   Date of Visit: 12/16/2024       To Whom It May Concern:    Ninfa Diaz was seen in my clinic on 12/16/2024 at 2:30 pm. Please excuse Ninfa for her absence from work on this day to make the appointment.   I had advised that Annabelle work from home through the remainder of this week until 12/23/24.    If you have any questions or concerns, please don't hesitate to call.         Sincerely,         Cain Clarke, DO        CC: No Recipients

## 2024-12-17 LAB
FLUAV RNA RESP QL NAA+PROBE: NOT DETECTED
FLUBV RNA RESP QL NAA+PROBE: NOT DETECTED
SARS-COV-2 ORF1AB RESP QL NAA+PROBE: NOT DETECTED

## 2024-12-19 LAB — S PYO THROAT QL CULT: NORMAL

## 2025-01-10 DIAGNOSIS — F90.9 ADULT ADHD: Chronic | ICD-10-CM

## 2025-01-10 RX ORDER — DEXTROAMPHETAMINE SACCHARATE, AMPHETAMINE ASPARTATE, DEXTROAMPHETAMINE SULFATE AND AMPHETAMINE SULFATE 7.5; 7.5; 7.5; 7.5 MG/1; MG/1; MG/1; MG/1
TABLET ORAL
Qty: 60 TABLET | Refills: 0 | Status: SHIPPED | OUTPATIENT
Start: 2025-01-10

## 2025-02-07 DIAGNOSIS — F90.9 ADULT ADHD: Chronic | ICD-10-CM

## 2025-02-07 RX ORDER — DEXTROAMPHETAMINE SACCHARATE, AMPHETAMINE ASPARTATE, DEXTROAMPHETAMINE SULFATE AND AMPHETAMINE SULFATE 7.5; 7.5; 7.5; 7.5 MG/1; MG/1; MG/1; MG/1
TABLET ORAL
Qty: 60 TABLET | Refills: 0 | Status: SHIPPED | OUTPATIENT
Start: 2025-02-07

## 2025-03-10 DIAGNOSIS — F90.9 ADULT ADHD: Chronic | ICD-10-CM

## 2025-03-10 RX ORDER — DEXTROAMPHETAMINE SACCHARATE, AMPHETAMINE ASPARTATE, DEXTROAMPHETAMINE SULFATE AND AMPHETAMINE SULFATE 7.5; 7.5; 7.5; 7.5 MG/1; MG/1; MG/1; MG/1
TABLET ORAL
Qty: 60 TABLET | Refills: 0 | Status: SHIPPED | OUTPATIENT
Start: 2025-03-10

## 2025-04-11 DIAGNOSIS — F90.9 ADULT ADHD: Chronic | ICD-10-CM

## 2025-04-11 RX ORDER — DEXTROAMPHETAMINE SACCHARATE, AMPHETAMINE ASPARTATE, DEXTROAMPHETAMINE SULFATE AND AMPHETAMINE SULFATE 7.5; 7.5; 7.5; 7.5 MG/1; MG/1; MG/1; MG/1
TABLET ORAL
Qty: 60 TABLET | Refills: 0 | Status: SHIPPED | OUTPATIENT
Start: 2025-04-11

## 2025-05-08 DIAGNOSIS — F90.9 ADULT ADHD: Chronic | ICD-10-CM

## 2025-05-08 RX ORDER — DEXTROAMPHETAMINE SACCHARATE, AMPHETAMINE ASPARTATE, DEXTROAMPHETAMINE SULFATE AND AMPHETAMINE SULFATE 7.5; 7.5; 7.5; 7.5 MG/1; MG/1; MG/1; MG/1
TABLET ORAL
Qty: 60 TABLET | Refills: 0 | Status: SHIPPED | OUTPATIENT
Start: 2025-05-08

## 2025-05-12 ASSESSMENT — PROMIS GLOBAL HEALTH SCALE
RATE_AVERAGE_FATIGUE: MODERATE
RATE_MENTAL_HEALTH: VERY GOOD
RATE_PHYSICAL_HEALTH: VERY GOOD
CARRYOUT_PHYSICAL_ACTIVITIES: COMPLETELY
RATE_QUALITY_OF_LIFE: VERY GOOD
RATE_SOCIAL_SATISFACTION: VERY GOOD
RATE_AVERAGE_PAIN: 0
RATE_GENERAL_HEALTH: VERY GOOD
EMOTIONAL_PROBLEMS: SOMETIMES
CARRYOUT_SOCIAL_ACTIVITIES: VERY GOOD

## 2025-05-16 ENCOUNTER — APPOINTMENT (OUTPATIENT)
Dept: PRIMARY CARE | Facility: CLINIC | Age: 40
End: 2025-05-16
Payer: COMMERCIAL

## 2025-05-16 VITALS
OXYGEN SATURATION: 97 % | HEART RATE: 68 BPM | BODY MASS INDEX: 31.39 KG/M2 | DIASTOLIC BLOOD PRESSURE: 78 MMHG | HEIGHT: 65 IN | WEIGHT: 188.4 LBS | SYSTOLIC BLOOD PRESSURE: 110 MMHG | TEMPERATURE: 97.7 F

## 2025-05-16 DIAGNOSIS — F90.9 ADULT ADHD: Chronic | ICD-10-CM

## 2025-05-16 DIAGNOSIS — Z79.899 HIGH RISK MEDICATION USE: ICD-10-CM

## 2025-05-16 DIAGNOSIS — Z00.00 ANNUAL PHYSICAL EXAM: Primary | ICD-10-CM

## 2025-05-16 DIAGNOSIS — M54.50 LOW BACK PAIN, UNSPECIFIED BACK PAIN LATERALITY, UNSPECIFIED CHRONICITY, UNSPECIFIED WHETHER SCIATICA PRESENT: ICD-10-CM

## 2025-05-16 DIAGNOSIS — E03.9 ACQUIRED HYPOTHYROIDISM: Chronic | ICD-10-CM

## 2025-05-16 DIAGNOSIS — C43.59 MALIGNANT MELANOMA OF TRUNK: ICD-10-CM

## 2025-05-16 DIAGNOSIS — Z12.31 VISIT FOR SCREENING MAMMOGRAM: ICD-10-CM

## 2025-05-16 PROCEDURE — 90471 IMMUNIZATION ADMIN: CPT | Performed by: INTERNAL MEDICINE

## 2025-05-16 PROCEDURE — 3008F BODY MASS INDEX DOCD: CPT | Performed by: INTERNAL MEDICINE

## 2025-05-16 PROCEDURE — 90715 TDAP VACCINE 7 YRS/> IM: CPT | Performed by: INTERNAL MEDICINE

## 2025-05-16 PROCEDURE — 1036F TOBACCO NON-USER: CPT | Performed by: INTERNAL MEDICINE

## 2025-05-16 PROCEDURE — 99396 PREV VISIT EST AGE 40-64: CPT | Performed by: INTERNAL MEDICINE

## 2025-05-16 RX ORDER — IBUPROFEN 800 MG/1
TABLET ORAL
Qty: 60 TABLET | Refills: 1 | Status: SHIPPED | OUTPATIENT
Start: 2025-05-16

## 2025-05-16 NOTE — PROGRESS NOTES
"Subjective   Patient ID: Ninfa Diaz is a 39 y.o. female who presents for Annual Exam (Patient is here for an annual physical exam. ).    here for CPE and to f/u on depression,ADHD,hypothyroid and obesity,she stopped drinking alcohol and saw nutritionist and endo at [facility].she has been off Naltrexone and  Wellbutrin that were prescribed for her weight management, she continue to exercise and able to lose more weight off both medication.  She is doing well with Adderall need to stay on it she is due to sign the contract.  She denies any depressed mood or anxiety.  She see gynecologist for pelvic exam.  Derm exam [facility] derm q 6 month  Urine drug screen 3/13/2024.  BI MAMMOGRAM: 6/6/2024         Review of Systems   Constitutional: Negative.         Lost weight with generic Contrave and exercise now off medication.   HENT: Negative.     Eyes: Negative.    Respiratory: Negative.     Cardiovascular: Negative.    Gastrointestinal: Negative.    Genitourinary: Negative.    Neurological: Negative.    Hematological: Negative.    Psychiatric/Behavioral: Negative.         Objective   /78 (BP Location: Left arm, Patient Position: Sitting)   Pulse 68   Temp 36.5 °C (97.7 °F) (Temporal)   Ht 1.645 m (5' 4.75\")   Wt 85.5 kg (188 lb 6.4 oz)   SpO2 97%   BMI 31.59 kg/m²     Physical Exam  Vitals reviewed.   Constitutional:       General: She is not in acute distress.     Appearance: Normal appearance. She is obese.   HENT:      Head: Normocephalic and atraumatic.      Right Ear: Tympanic membrane, ear canal and external ear normal.      Left Ear: Tympanic membrane, ear canal and external ear normal.      Mouth/Throat:      Mouth: Mucous membranes are moist.      Pharynx: No oropharyngeal exudate or posterior oropharyngeal erythema.   Eyes:      General: No scleral icterus.     Extraocular Movements: Extraocular movements intact.      Pupils: Pupils are equal, round, and reactive to light.   Cardiovascular:      Rate " and Rhythm: Normal rate and regular rhythm.      Pulses: Normal pulses.      Heart sounds: Normal heart sounds. No murmur heard.  Pulmonary:      Effort: Pulmonary effort is normal. No respiratory distress.      Breath sounds: Normal breath sounds. No wheezing.   Abdominal:      General: Abdomen is flat. Bowel sounds are normal.      Palpations: Abdomen is soft.      Tenderness: There is no right CVA tenderness or left CVA tenderness.   Musculoskeletal:         General: No swelling. Normal range of motion.      Cervical back: Normal range of motion and neck supple.      Right lower leg: No edema.      Left lower leg: No edema.   Lymphadenopathy:      Cervical: No cervical adenopathy.   Skin:     Comments: Skin exam done by dermatologist   Neurological:      General: No focal deficit present.      Mental Status: She is alert and oriented to person, place, and time.      Cranial Nerves: No cranial nerve deficit.      Coordination: Coordination normal.   Psychiatric:         Mood and Affect: Mood normal.         Assessment/Plan   Problem List Items Addressed This Visit           ICD-10-CM    Adult ADHD (Chronic) F90.9    Doing well with Adderall.  She has agreement signed today.  Will continue with yearly urine drug screen.  Follow-up in 6 months.         Hypothyroid (Chronic) E03.9    Relevant Orders    Thyroid Peroxidase (TPO) Antibody (Completed)    Low back pain M54.50    Relevant Medications    ibuprofen 800 mg tablet    Malignant melanoma of trunk C43.59    Patient seen by dermatologist closely.         High risk medication use Z79.899    Relevant Orders    DRUG SCREEN,URINE (Completed)    Annual physical exam - Primary Z00.00    Complete physical examination completed today.  Advised to keep a heart healthy, low-fat diet as recommended diet is the Mediterranean diet.  Advised to exercise regularly for 30 minutes daily 5 days a week and maintain 150 minutes of exercise per week.  Discussed age-appropriate cancer  screening,Immunization and recommendation were given.  Advised on regular eye and dental visit.  Advised on staying well-hydrated.         Relevant Orders    CBC (Completed)    Comprehensive Metabolic Panel (Completed)    Lipid Panel (Completed)    TSH with reflex to Free T4 if abnormal (Completed)    Visit for screening mammogram Z12.31    Relevant Orders    BI mammo bilateral screening tomosynthesis

## 2025-05-17 PROBLEM — Z12.31 VISIT FOR SCREENING MAMMOGRAM: Status: ACTIVE | Noted: 2025-05-17

## 2025-05-17 PROBLEM — F32.A MILD DEPRESSION: Status: RESOLVED | Noted: 2023-02-17 | Resolved: 2025-05-17

## 2025-05-17 LAB
ALBUMIN SERPL-MCNC: 4.3 G/DL (ref 3.6–5.1)
ALP SERPL-CCNC: 57 U/L (ref 31–125)
ALT SERPL-CCNC: 13 U/L (ref 6–29)
AMPHETAMINES UR QL: POSITIVE NG/ML
ANION GAP SERPL CALCULATED.4IONS-SCNC: 9 MMOL/L (CALC) (ref 7–17)
AST SERPL-CCNC: 11 U/L (ref 10–30)
BARBITURATES UR QL: NEGATIVE NG/ML
BENZODIAZ UR QL: NEGATIVE NG/ML
BILIRUB SERPL-MCNC: 0.4 MG/DL (ref 0.2–1.2)
BUN SERPL-MCNC: 12 MG/DL (ref 7–25)
BZE UR QL: NEGATIVE NG/ML
CALCIUM SERPL-MCNC: 9.3 MG/DL (ref 8.6–10.2)
CHLORIDE SERPL-SCNC: 103 MMOL/L (ref 98–110)
CHOLEST SERPL-MCNC: 217 MG/DL
CHOLEST/HDLC SERPL: 2.9 (CALC)
CO2 SERPL-SCNC: 26 MMOL/L (ref 20–32)
CREAT SERPL-MCNC: 0.58 MG/DL (ref 0.5–0.99)
CREAT UR-MCNC: 68.4 MG/DL
EGFRCR SERPLBLD CKD-EPI 2021: 117 ML/MIN/1.73M2
ERYTHROCYTE [DISTWIDTH] IN BLOOD BY AUTOMATED COUNT: 12.4 % (ref 11–15)
FENTANYL UR QL SCN: NEGATIVE NG/ML
GLUCOSE SERPL-MCNC: 98 MG/DL (ref 65–99)
HCT VFR BLD AUTO: 42.7 % (ref 35–45)
HDLC SERPL-MCNC: 75 MG/DL
HGB BLD-MCNC: 13.4 G/DL (ref 11.7–15.5)
LDLC SERPL CALC-MCNC: 123 MG/DL (CALC)
MCH RBC QN AUTO: 30.9 PG (ref 27–33)
MCHC RBC AUTO-ENTMCNC: 31.4 G/DL (ref 32–36)
MCV RBC AUTO: 98.4 FL (ref 80–100)
METHADONE UR QL: NEGATIVE NG/ML
NONHDLC SERPL-MCNC: 142 MG/DL (CALC)
OPIATES UR QL: POSITIVE NG/ML
OXIDANTS UR QL: NEGATIVE MCG/ML
OXYCODONE UR QL: NEGATIVE NG/ML
PH UR: 5.5 [PH] (ref 4.5–9)
PLATELET # BLD AUTO: 382 THOUSAND/UL (ref 140–400)
PMV BLD REES-ECKER: 9 FL (ref 7.5–12.5)
POTASSIUM SERPL-SCNC: 4.2 MMOL/L (ref 3.5–5.3)
PROT SERPL-MCNC: 6.7 G/DL (ref 6.1–8.1)
QUEST NOTES AND COMMENTS: ABNORMAL
RBC # BLD AUTO: 4.34 MILLION/UL (ref 3.8–5.1)
SODIUM SERPL-SCNC: 138 MMOL/L (ref 135–146)
THC UR QL: NEGATIVE NG/ML
THYROPEROXIDASE AB SERPL-ACNC: NORMAL [IU]/ML
TRIGL SERPL-MCNC: 93 MG/DL
TSH SERPL-ACNC: 0.62 MIU/L
WBC # BLD AUTO: 8.5 THOUSAND/UL (ref 3.8–10.8)

## 2025-05-17 ASSESSMENT — ENCOUNTER SYMPTOMS
HEMATOLOGIC/LYMPHATIC NEGATIVE: 1
GASTROINTESTINAL NEGATIVE: 1
CARDIOVASCULAR NEGATIVE: 1
RESPIRATORY NEGATIVE: 1
EYES NEGATIVE: 1
CONSTITUTIONAL NEGATIVE: 1
PSYCHIATRIC NEGATIVE: 1
NEUROLOGICAL NEGATIVE: 1

## 2025-05-17 NOTE — ASSESSMENT & PLAN NOTE
Doing well with Adderall.  She has agreement signed today.  Will continue with yearly urine drug screen.  Follow-up in 6 months.

## 2025-05-18 NOTE — RESULT ENCOUNTER NOTE
Labs show mildly elevated LDL cholesterol above normal, urine drug screen was positive for amphetamine due to being on Adderall and positive for opiates.

## 2025-05-19 ENCOUNTER — PATIENT MESSAGE (OUTPATIENT)
Dept: PRIMARY CARE | Facility: CLINIC | Age: 40
End: 2025-05-19
Payer: COMMERCIAL

## 2025-05-19 ENCOUNTER — TELEPHONE (OUTPATIENT)
Dept: PRIMARY CARE | Facility: CLINIC | Age: 40
End: 2025-05-19
Payer: COMMERCIAL

## 2025-05-19 DIAGNOSIS — Z79.899 HIGH RISK MEDICATION USE: Primary | ICD-10-CM

## 2025-05-19 LAB
ALBUMIN SERPL-MCNC: 4.3 G/DL (ref 3.6–5.1)
ALP SERPL-CCNC: 57 U/L (ref 31–125)
ALT SERPL-CCNC: 13 U/L (ref 6–29)
ANION GAP SERPL CALCULATED.4IONS-SCNC: 9 MMOL/L (CALC) (ref 7–17)
AST SERPL-CCNC: 11 U/L (ref 10–30)
BILIRUB SERPL-MCNC: 0.4 MG/DL (ref 0.2–1.2)
BUN SERPL-MCNC: 12 MG/DL (ref 7–25)
CALCIUM SERPL-MCNC: 9.3 MG/DL (ref 8.6–10.2)
CHLORIDE SERPL-SCNC: 103 MMOL/L (ref 98–110)
CHOLEST SERPL-MCNC: 217 MG/DL
CHOLEST/HDLC SERPL: 2.9 (CALC)
CO2 SERPL-SCNC: 26 MMOL/L (ref 20–32)
CREAT SERPL-MCNC: 0.58 MG/DL (ref 0.5–0.99)
EGFRCR SERPLBLD CKD-EPI 2021: 117 ML/MIN/1.73M2
ERYTHROCYTE [DISTWIDTH] IN BLOOD BY AUTOMATED COUNT: 12.4 % (ref 11–15)
GLUCOSE SERPL-MCNC: 98 MG/DL (ref 65–99)
HCT VFR BLD AUTO: 42.7 % (ref 35–45)
HDLC SERPL-MCNC: 75 MG/DL
HGB BLD-MCNC: 13.4 G/DL (ref 11.7–15.5)
LDLC SERPL CALC-MCNC: 123 MG/DL (CALC)
MCH RBC QN AUTO: 30.9 PG (ref 27–33)
MCHC RBC AUTO-ENTMCNC: 31.4 G/DL (ref 32–36)
MCV RBC AUTO: 98.4 FL (ref 80–100)
NONHDLC SERPL-MCNC: 142 MG/DL (CALC)
PLATELET # BLD AUTO: 382 THOUSAND/UL (ref 140–400)
PMV BLD REES-ECKER: 9 FL (ref 7.5–12.5)
POTASSIUM SERPL-SCNC: 4.2 MMOL/L (ref 3.5–5.3)
PROT SERPL-MCNC: 6.7 G/DL (ref 6.1–8.1)
RBC # BLD AUTO: 4.34 MILLION/UL (ref 3.8–5.1)
SODIUM SERPL-SCNC: 138 MMOL/L (ref 135–146)
THYROPEROXIDASE AB SERPL-ACNC: 7 IU/ML
TRIGL SERPL-MCNC: 93 MG/DL
TSH SERPL-ACNC: 0.62 MIU/L
WBC # BLD AUTO: 8.5 THOUSAND/UL (ref 3.8–10.8)

## 2025-05-19 NOTE — TELEPHONE ENCOUNTER
Pt called into the office in regards to this message from Dr Salomon. She is upset stating Dr Salomon knew what meds she was taking to try to help her lose weight: Wellbutrin and Maltrexone. Please advise if we can reach back out to her. Thank you!  Charles Ocasio :  Your labs show mildly elevated LDL cholesterol .  Your urine drug screen was positive for amphetamine (due to being on Adderall )and also was positive for opiates.  In general I am no longer able to prescribe your Adderall due to positive for opiates on your urine drug screen, but if you have an explanation and let me know who prescribes opiate for you, we will plan on rechecking your urine drug screen at one point in the future before I will be able to resume prescribing your Adderall.  Please call if you have any question.  Dr Salomon

## 2025-05-21 NOTE — TELEPHONE ENCOUNTER
I called patient and she told me that she is taking her last dose of naltrexone and Wellbutrin today that is prescribed from the endocrinologist at [facility] for her weight.  We discussed that in some rare cases, taking naltrexone might cause false positive opioid urine drug test, even though it is a pure opioid antagonist.  I advised her to repeat urine drug screen in 1 to 2 weeks with confirmatory test, otherwise she might need to talk to her endocrinologist at [facility] and find either a psychiatrist or neurologist who worked with her endocrinologist closely to order her Adderall.

## 2025-05-28 DIAGNOSIS — Z79.899 HIGH RISK MEDICATION USE: ICD-10-CM

## 2025-06-04 LAB
AMPHET UR-MCNC: 7771 NG/ML
AMPHETAMINES UR QL: POSITIVE NG/ML
BARBITURATES UR QL: NEGATIVE NG/ML
BENZODIAZ UR QL: NEGATIVE NG/ML
BZE UR QL: NEGATIVE NG/ML
CREAT UR-MCNC: 124.8 MG/DL
DRUG SCREEN COMMENT UR-IMP: ABNORMAL
FENTANYL UR QL SCN: NEGATIVE NG/ML
METHADONE UR QL: NEGATIVE NG/ML
METHAMPHET UR-MCNC: NEGATIVE NG/ML
OPIATES UR QL: NEGATIVE NG/ML
OXIDANTS UR QL: NEGATIVE MCG/ML
OXYCODONE UR QL: NEGATIVE NG/ML
PCP UR QL: NEGATIVE NG/ML
PH UR: 5 [PH] (ref 4.5–9)
QUEST NOTES AND COMMENTS: ABNORMAL
THC UR QL: NEGATIVE NG/ML

## 2025-06-09 DIAGNOSIS — F90.9 ADULT ADHD: Chronic | ICD-10-CM

## 2025-06-09 RX ORDER — DEXTROAMPHETAMINE SACCHARATE, AMPHETAMINE ASPARTATE, DEXTROAMPHETAMINE SULFATE AND AMPHETAMINE SULFATE 7.5; 7.5; 7.5; 7.5 MG/1; MG/1; MG/1; MG/1
TABLET ORAL
Qty: 60 TABLET | Refills: 0 | Status: SHIPPED | OUTPATIENT
Start: 2025-06-09

## 2025-06-13 ENCOUNTER — HOSPITAL ENCOUNTER (OUTPATIENT)
Dept: RADIOLOGY | Facility: CLINIC | Age: 40
Discharge: HOME | End: 2025-06-13
Payer: COMMERCIAL

## 2025-06-13 VITALS — WEIGHT: 188.4 LBS | HEIGHT: 65 IN | BODY MASS INDEX: 31.39 KG/M2

## 2025-06-13 DIAGNOSIS — Z12.31 VISIT FOR SCREENING MAMMOGRAM: ICD-10-CM

## 2025-06-13 PROCEDURE — 77067 SCR MAMMO BI INCL CAD: CPT

## 2025-06-16 DIAGNOSIS — R92.8 ABNORMALITY OF RIGHT BREAST ON SCREENING MAMMOGRAM: Primary | ICD-10-CM

## 2025-06-24 DIAGNOSIS — E03.9 HYPOTHYROIDISM, UNSPECIFIED: ICD-10-CM

## 2025-06-24 RX ORDER — LEVOTHYROXINE SODIUM 50 UG/1
50 TABLET ORAL DAILY
Qty: 90 TABLET | Refills: 0 | Status: SHIPPED | OUTPATIENT
Start: 2025-06-24

## 2025-07-03 ENCOUNTER — HOSPITAL ENCOUNTER (OUTPATIENT)
Dept: RADIOLOGY | Facility: HOSPITAL | Age: 40
Discharge: HOME | End: 2025-07-03
Payer: COMMERCIAL

## 2025-07-03 ENCOUNTER — TELEPHONE (OUTPATIENT)
Dept: PRIMARY CARE | Facility: CLINIC | Age: 40
End: 2025-07-03

## 2025-07-03 DIAGNOSIS — F90.9 ADULT ADHD: Chronic | ICD-10-CM

## 2025-07-03 DIAGNOSIS — R92.8 ABNORMAL MAMMOGRAM: Primary | ICD-10-CM

## 2025-07-03 DIAGNOSIS — R92.8 ABNORMALITY OF RIGHT BREAST ON SCREENING MAMMOGRAM: ICD-10-CM

## 2025-07-03 PROCEDURE — 77065 DX MAMMO INCL CAD UNI: CPT | Mod: RIGHT SIDE | Performed by: RADIOLOGY

## 2025-07-03 PROCEDURE — 76982 USE 1ST TARGET LESION: CPT | Mod: RT

## 2025-07-03 PROCEDURE — 77061 BREAST TOMOSYNTHESIS UNI: CPT | Mod: RT

## 2025-07-03 PROCEDURE — 76642 ULTRASOUND BREAST LIMITED: CPT | Mod: RIGHT SIDE | Performed by: RADIOLOGY

## 2025-07-03 PROCEDURE — 77061 BREAST TOMOSYNTHESIS UNI: CPT | Mod: RIGHT SIDE | Performed by: RADIOLOGY

## 2025-07-03 PROCEDURE — 76642 ULTRASOUND BREAST LIMITED: CPT | Mod: RT

## 2025-07-03 RX ORDER — DEXTROAMPHETAMINE SACCHARATE, AMPHETAMINE ASPARTATE, DEXTROAMPHETAMINE SULFATE AND AMPHETAMINE SULFATE 7.5; 7.5; 7.5; 7.5 MG/1; MG/1; MG/1; MG/1
TABLET ORAL
Qty: 60 TABLET | Refills: 0 | Status: SHIPPED | OUTPATIENT
Start: 2025-07-03

## 2025-08-04 DIAGNOSIS — F90.9 ADULT ADHD: Chronic | ICD-10-CM

## 2025-08-04 RX ORDER — DEXTROAMPHETAMINE SACCHARATE, AMPHETAMINE ASPARTATE, DEXTROAMPHETAMINE SULFATE AND AMPHETAMINE SULFATE 7.5; 7.5; 7.5; 7.5 MG/1; MG/1; MG/1; MG/1
TABLET ORAL
Qty: 60 TABLET | Refills: 0 | Status: SHIPPED | OUTPATIENT
Start: 2025-08-04

## 2025-08-26 DIAGNOSIS — F90.9 ADULT ADHD: Chronic | ICD-10-CM

## 2025-08-26 RX ORDER — DEXTROAMPHETAMINE SACCHARATE, AMPHETAMINE ASPARTATE, DEXTROAMPHETAMINE SULFATE AND AMPHETAMINE SULFATE 7.5; 7.5; 7.5; 7.5 MG/1; MG/1; MG/1; MG/1
TABLET ORAL
Qty: 60 TABLET | Refills: 0 | Status: SHIPPED | OUTPATIENT
Start: 2025-08-26

## 2025-09-26 ENCOUNTER — APPOINTMENT (OUTPATIENT)
Dept: OBSTETRICS AND GYNECOLOGY | Facility: CLINIC | Age: 40
End: 2025-09-26
Payer: COMMERCIAL

## 2025-11-21 ENCOUNTER — APPOINTMENT (OUTPATIENT)
Dept: PRIMARY CARE | Facility: CLINIC | Age: 40
End: 2025-11-21
Payer: COMMERCIAL

## 2026-05-15 ENCOUNTER — APPOINTMENT (OUTPATIENT)
Dept: PRIMARY CARE | Facility: CLINIC | Age: 41
End: 2026-05-15
Payer: COMMERCIAL